# Patient Record
Sex: MALE | Race: WHITE | Employment: FULL TIME | URBAN - METROPOLITAN AREA
[De-identification: names, ages, dates, MRNs, and addresses within clinical notes are randomized per-mention and may not be internally consistent; named-entity substitution may affect disease eponyms.]

---

## 2018-04-05 ENCOUNTER — INITIAL CONSULT (OUTPATIENT)
Dept: SURGERY | Age: 63
End: 2018-04-05
Payer: COMMERCIAL

## 2018-04-05 VITALS
TEMPERATURE: 98.3 F | SYSTOLIC BLOOD PRESSURE: 139 MMHG | OXYGEN SATURATION: 97 % | HEART RATE: 82 BPM | WEIGHT: 214.6 LBS | DIASTOLIC BLOOD PRESSURE: 82 MMHG

## 2018-04-05 DIAGNOSIS — Z12.11 COLON CANCER SCREENING: Primary | ICD-10-CM

## 2018-04-05 PROCEDURE — G8427 DOCREV CUR MEDS BY ELIG CLIN: HCPCS | Performed by: SURGERY

## 2018-04-05 PROCEDURE — 4004F PT TOBACCO SCREEN RCVD TLK: CPT | Performed by: SURGERY

## 2018-04-05 PROCEDURE — 3017F COLORECTAL CA SCREEN DOC REV: CPT | Performed by: SURGERY

## 2018-04-05 PROCEDURE — 99203 OFFICE O/P NEW LOW 30 MIN: CPT | Performed by: SURGERY

## 2018-04-05 PROCEDURE — G8421 BMI NOT CALCULATED: HCPCS | Performed by: SURGERY

## 2018-04-05 RX ORDER — ASPIRIN 81 MG/1
81 TABLET ORAL DAILY
COMMUNITY

## 2018-04-05 RX ORDER — VENLAFAXINE HYDROCHLORIDE 150 MG/1
150 CAPSULE, EXTENDED RELEASE ORAL DAILY
COMMUNITY

## 2018-04-05 RX ORDER — CARVEDILOL 3.12 MG/1
3.12 TABLET ORAL 2 TIMES DAILY WITH MEALS
COMMUNITY

## 2018-04-05 RX ORDER — LOSARTAN POTASSIUM 50 MG/1
50 TABLET ORAL DAILY
COMMUNITY

## 2018-04-05 RX ORDER — ROSUVASTATIN CALCIUM 10 MG/1
10 TABLET, COATED ORAL DAILY
COMMUNITY

## 2018-04-05 ASSESSMENT — ENCOUNTER SYMPTOMS
EYE PAIN: 0
ANAL BLEEDING: 0
EYE DISCHARGE: 0
ABDOMINAL PAIN: 0
BACK PAIN: 0
COLOR CHANGE: 0
VOMITING: 0
TROUBLE SWALLOWING: 0
CHOKING: 0
ABDOMINAL DISTENTION: 0
SHORTNESS OF BREATH: 0
CHEST TIGHTNESS: 0
WHEEZING: 0

## 2018-04-07 RX ORDER — SODIUM, POTASSIUM,MAG SULFATES 17.5-3.13G
1 SOLUTION, RECONSTITUTED, ORAL ORAL ONCE
Qty: 1 BOTTLE | Refills: 0 | Status: SHIPPED | OUTPATIENT
Start: 2018-04-07 | End: 2018-04-07

## 2023-02-08 PROBLEM — Z79.4 TYPE 2 DIABETES MELLITUS WITH HYPERGLYCEMIA, WITH LONG-TERM CURRENT USE OF INSULIN (MULTI): Status: ACTIVE | Noted: 2023-02-08

## 2023-02-08 PROBLEM — N40.1 BPH WITH OBSTRUCTION/LOWER URINARY TRACT SYMPTOMS: Status: ACTIVE | Noted: 2023-02-08

## 2023-02-08 PROBLEM — S76.019A HIP STRAIN: Status: ACTIVE | Noted: 2023-02-08

## 2023-02-08 PROBLEM — F41.8 DEPRESSION WITH ANXIETY: Status: ACTIVE | Noted: 2023-02-08

## 2023-02-08 PROBLEM — E66.09 NON MORBID OBESITY DUE TO EXCESS CALORIES: Status: ACTIVE | Noted: 2023-02-08

## 2023-02-08 PROBLEM — K43.9 ABDOMINAL WALL HERNIA: Status: ACTIVE | Noted: 2023-02-08

## 2023-02-08 PROBLEM — N20.0 NEPHROLITHIASIS: Status: ACTIVE | Noted: 2023-02-08

## 2023-02-08 PROBLEM — R63.5 WEIGHT GAIN: Status: ACTIVE | Noted: 2023-02-08

## 2023-02-08 PROBLEM — F51.01 PRIMARY INSOMNIA: Status: ACTIVE | Noted: 2023-02-08

## 2023-02-08 PROBLEM — R05.9 COUGH: Status: ACTIVE | Noted: 2023-02-08

## 2023-02-08 PROBLEM — F51.02 ADJUSTMENT INSOMNIA: Status: ACTIVE | Noted: 2023-02-08

## 2023-02-08 PROBLEM — N13.8 BPH WITH OBSTRUCTION/LOWER URINARY TRACT SYMPTOMS: Status: ACTIVE | Noted: 2023-02-08

## 2023-02-08 PROBLEM — M75.100 ROTATOR CUFF TEAR: Status: ACTIVE | Noted: 2023-02-08

## 2023-02-08 PROBLEM — M70.62 TROCHANTERIC BURSITIS OF LEFT HIP: Status: ACTIVE | Noted: 2023-02-08

## 2023-02-08 PROBLEM — M79.673 FOOT PAIN: Status: ACTIVE | Noted: 2023-02-08

## 2023-02-08 PROBLEM — S39.012A LUMBAR STRAIN: Status: ACTIVE | Noted: 2023-02-08

## 2023-02-08 PROBLEM — D50.9 ANEMIA, IRON DEFICIENCY: Status: ACTIVE | Noted: 2023-02-08

## 2023-02-08 PROBLEM — I25.10 CORONARY ARTERY DISEASE INVOLVING NATIVE CORONARY ARTERY OF NATIVE HEART WITHOUT ANGINA PECTORIS: Status: ACTIVE | Noted: 2023-02-08

## 2023-02-08 PROBLEM — M25.519 SHOULDER PAIN: Status: ACTIVE | Noted: 2023-02-08

## 2023-02-08 PROBLEM — E55.9 VITAMIN D DEFICIENCY: Status: ACTIVE | Noted: 2023-02-08

## 2023-02-08 PROBLEM — M54.50 LOW BACK PAIN: Status: ACTIVE | Noted: 2023-02-08

## 2023-02-08 PROBLEM — F33.42 RECURRENT MAJOR DEPRESSIVE DISORDER, IN FULL REMISSION (CMS-HCC): Status: ACTIVE | Noted: 2023-02-08

## 2023-02-08 PROBLEM — M54.16 LUMBAR RADICULAR PAIN: Status: ACTIVE | Noted: 2023-02-08

## 2023-02-08 PROBLEM — M72.2 PLANTAR FASCIITIS: Status: ACTIVE | Noted: 2023-02-08

## 2023-02-08 PROBLEM — E11.65 TYPE 2 DIABETES MELLITUS WITH HYPERGLYCEMIA, WITH LONG-TERM CURRENT USE OF INSULIN (MULTI): Status: ACTIVE | Noted: 2023-02-08

## 2023-02-08 PROBLEM — K59.01 SLOW TRANSIT CONSTIPATION: Status: ACTIVE | Noted: 2023-02-08

## 2023-02-08 PROBLEM — R53.81 DEBILITY: Status: ACTIVE | Noted: 2023-02-08

## 2023-02-08 PROBLEM — M25.559 HIP JOINT PAIN: Status: ACTIVE | Noted: 2023-02-08

## 2023-02-08 PROBLEM — M25.50 POLYARTHRALGIA: Status: ACTIVE | Noted: 2023-02-08

## 2023-02-08 PROBLEM — I48.0 PAROXYSMAL ATRIAL FIBRILLATION (MULTI): Status: ACTIVE | Noted: 2023-02-08

## 2023-02-08 PROBLEM — E78.2 MIXED HYPERLIPIDEMIA: Status: ACTIVE | Noted: 2023-02-08

## 2023-02-08 PROBLEM — I10 ESSENTIAL HYPERTENSION: Status: ACTIVE | Noted: 2023-02-08

## 2023-02-08 RX ORDER — METFORMIN HYDROCHLORIDE 850 MG/1
1 TABLET ORAL 3 TIMES DAILY
COMMUNITY
Start: 2017-04-10 | End: 2023-05-15

## 2023-02-08 RX ORDER — VENLAFAXINE HYDROCHLORIDE 150 MG/1
1 CAPSULE, EXTENDED RELEASE ORAL
COMMUNITY
End: 2023-03-23 | Stop reason: SDUPTHER

## 2023-02-08 RX ORDER — ROSUVASTATIN CALCIUM 10 MG/1
1 TABLET, COATED ORAL NIGHTLY
COMMUNITY
Start: 2019-03-20 | End: 2023-06-10

## 2023-02-08 RX ORDER — CELECOXIB 200 MG/1
1 CAPSULE ORAL DAILY PRN
COMMUNITY
Start: 2022-01-21 | End: 2023-07-18 | Stop reason: ALTCHOICE

## 2023-02-08 RX ORDER — TAMSULOSIN HYDROCHLORIDE 0.4 MG/1
CAPSULE ORAL
COMMUNITY
Start: 2018-06-19

## 2023-02-08 RX ORDER — MULTIVIT-MIN/IRON/FOLIC ACID/K 18-600-40
CAPSULE ORAL
COMMUNITY

## 2023-02-08 RX ORDER — VIT C/E/ZN/COPPR/LUTEIN/ZEAXAN 250MG-90MG
25 CAPSULE ORAL EVERY OTHER DAY
COMMUNITY
End: 2024-04-11 | Stop reason: SDUPTHER

## 2023-02-08 RX ORDER — DULAGLUTIDE 1.5 MG/.5ML
0.5 INJECTION, SOLUTION SUBCUTANEOUS
COMMUNITY
Start: 2020-05-22 | End: 2023-03-23 | Stop reason: SDUPTHER

## 2023-02-08 RX ORDER — CARVEDILOL 3.12 MG/1
1 TABLET ORAL 2 TIMES DAILY
COMMUNITY
Start: 2019-02-15 | End: 2023-06-14

## 2023-02-08 RX ORDER — FERROUS GLUCONATE 324(38)MG
1 TABLET ORAL 2 TIMES DAILY
COMMUNITY
Start: 2022-11-16 | End: 2024-04-11 | Stop reason: SDUPTHER

## 2023-02-08 RX ORDER — WARFARIN SODIUM 5 MG/1
TABLET ORAL
COMMUNITY
Start: 2020-07-13 | End: 2023-03-21 | Stop reason: ALTCHOICE

## 2023-02-08 RX ORDER — LOSARTAN POTASSIUM 100 MG/1
1 TABLET ORAL DAILY
COMMUNITY
Start: 2019-04-17 | End: 2023-06-14

## 2023-02-08 RX ORDER — INSULIN DEGLUDEC 100 U/ML
35 INJECTION, SOLUTION SUBCUTANEOUS DAILY
COMMUNITY
Start: 2018-12-10 | End: 2023-06-14

## 2023-02-08 RX ORDER — ASPIRIN 81 MG/1
TABLET ORAL
COMMUNITY

## 2023-02-08 RX ORDER — CYCLOBENZAPRINE HCL 10 MG
1 TABLET ORAL NIGHTLY
COMMUNITY
Start: 2022-01-05 | End: 2023-07-18 | Stop reason: ALTCHOICE

## 2023-02-08 RX ORDER — GABAPENTIN 300 MG/1
1 CAPSULE ORAL 3 TIMES DAILY
COMMUNITY
Start: 2022-02-07

## 2023-03-14 LAB
ALANINE AMINOTRANSFERASE (SGPT) (U/L) IN SER/PLAS: 15 U/L (ref 10–52)
ALBUMIN (G/DL) IN SER/PLAS: 4.1 G/DL (ref 3.4–5)
ALKALINE PHOSPHATASE (U/L) IN SER/PLAS: 69 U/L (ref 33–136)
ANION GAP IN SER/PLAS: 10 MMOL/L (ref 10–20)
ASPARTATE AMINOTRANSFERASE (SGOT) (U/L) IN SER/PLAS: 15 U/L (ref 9–39)
BASOPHILS (10*3/UL) IN BLOOD BY AUTOMATED COUNT: 0.07 X10E9/L (ref 0–0.1)
BASOPHILS/100 LEUKOCYTES IN BLOOD BY AUTOMATED COUNT: 1 % (ref 0–2)
BILIRUBIN TOTAL (MG/DL) IN SER/PLAS: 0.6 MG/DL (ref 0–1.2)
CALCIUM (MG/DL) IN SER/PLAS: 8.9 MG/DL (ref 8.6–10.3)
CARBON DIOXIDE, TOTAL (MMOL/L) IN SER/PLAS: 30 MMOL/L (ref 21–32)
CHLORIDE (MMOL/L) IN SER/PLAS: 103 MMOL/L (ref 98–107)
CREATININE (MG/DL) IN SER/PLAS: 0.84 MG/DL (ref 0.5–1.3)
EOSINOPHILS (10*3/UL) IN BLOOD BY AUTOMATED COUNT: 0.65 X10E9/L (ref 0–0.7)
EOSINOPHILS/100 LEUKOCYTES IN BLOOD BY AUTOMATED COUNT: 9.5 % (ref 0–6)
ERYTHROCYTE DISTRIBUTION WIDTH (RATIO) BY AUTOMATED COUNT: 13.6 % (ref 11.5–14.5)
ERYTHROCYTE MEAN CORPUSCULAR HEMOGLOBIN CONCENTRATION (G/DL) BY AUTOMATED: 31.7 G/DL (ref 32–36)
ERYTHROCYTE MEAN CORPUSCULAR VOLUME (FL) BY AUTOMATED COUNT: 90 FL (ref 80–100)
ERYTHROCYTES (10*6/UL) IN BLOOD BY AUTOMATED COUNT: 4.07 X10E12/L (ref 4.5–5.9)
ESTIMATED AVERAGE GLUCOSE FOR HBA1C: 148 MG/DL
GFR MALE: >90 ML/MIN/1.73M2
GLUCOSE (MG/DL) IN SER/PLAS: 101 MG/DL (ref 74–99)
HEMATOCRIT (%) IN BLOOD BY AUTOMATED COUNT: 36.6 % (ref 41–52)
HEMOGLOBIN (G/DL) IN BLOOD: 11.6 G/DL (ref 13.5–17.5)
HEMOGLOBIN A1C/HEMOGLOBIN TOTAL IN BLOOD: 6.8 %
IMMATURE GRANULOCYTES/100 LEUKOCYTES IN BLOOD BY AUTOMATED COUNT: 0.3 % (ref 0–0.9)
LEUKOCYTES (10*3/UL) IN BLOOD BY AUTOMATED COUNT: 6.9 X10E9/L (ref 4.4–11.3)
LYMPHOCYTES (10*3/UL) IN BLOOD BY AUTOMATED COUNT: 1.86 X10E9/L (ref 1.2–4.8)
LYMPHOCYTES/100 LEUKOCYTES IN BLOOD BY AUTOMATED COUNT: 27.2 % (ref 13–44)
MONOCYTES (10*3/UL) IN BLOOD BY AUTOMATED COUNT: 0.4 X10E9/L (ref 0.1–1)
MONOCYTES/100 LEUKOCYTES IN BLOOD BY AUTOMATED COUNT: 5.8 % (ref 2–10)
NEUTROPHILS (10*3/UL) IN BLOOD BY AUTOMATED COUNT: 3.85 X10E9/L (ref 1.2–7.7)
NEUTROPHILS/100 LEUKOCYTES IN BLOOD BY AUTOMATED COUNT: 56.2 % (ref 40–80)
PLATELETS (10*3/UL) IN BLOOD AUTOMATED COUNT: 317 X10E9/L (ref 150–450)
POTASSIUM (MMOL/L) IN SER/PLAS: 4 MMOL/L (ref 3.5–5.3)
PROTEIN TOTAL: 6.9 G/DL (ref 6.4–8.2)
SODIUM (MMOL/L) IN SER/PLAS: 139 MMOL/L (ref 136–145)
UREA NITROGEN (MG/DL) IN SER/PLAS: 11 MG/DL (ref 6–23)

## 2023-03-21 ENCOUNTER — OFFICE VISIT (OUTPATIENT)
Dept: PRIMARY CARE | Facility: CLINIC | Age: 68
End: 2023-03-21
Payer: MEDICARE

## 2023-03-21 VITALS
DIASTOLIC BLOOD PRESSURE: 75 MMHG | HEART RATE: 79 BPM | WEIGHT: 212 LBS | HEIGHT: 69 IN | SYSTOLIC BLOOD PRESSURE: 118 MMHG | OXYGEN SATURATION: 97 % | BODY MASS INDEX: 31.4 KG/M2 | RESPIRATION RATE: 20 BRPM

## 2023-03-21 DIAGNOSIS — R73.03 PREDIABETES: ICD-10-CM

## 2023-03-21 DIAGNOSIS — I10 ESSENTIAL HYPERTENSION: ICD-10-CM

## 2023-03-21 DIAGNOSIS — E66.09 NON MORBID OBESITY DUE TO EXCESS CALORIES: ICD-10-CM

## 2023-03-21 DIAGNOSIS — N20.0 NEPHROLITHIASIS: ICD-10-CM

## 2023-03-21 DIAGNOSIS — E78.2 MIXED HYPERLIPIDEMIA: ICD-10-CM

## 2023-03-21 DIAGNOSIS — E55.9 VITAMIN D DEFICIENCY: ICD-10-CM

## 2023-03-21 DIAGNOSIS — D50.9 IRON DEFICIENCY ANEMIA, UNSPECIFIED IRON DEFICIENCY ANEMIA TYPE: Primary | ICD-10-CM

## 2023-03-21 PROCEDURE — 3008F BODY MASS INDEX DOCD: CPT | Performed by: INTERNAL MEDICINE

## 2023-03-21 PROCEDURE — 3044F HG A1C LEVEL LT 7.0%: CPT | Performed by: INTERNAL MEDICINE

## 2023-03-21 PROCEDURE — 1160F RVW MEDS BY RX/DR IN RCRD: CPT | Performed by: INTERNAL MEDICINE

## 2023-03-21 PROCEDURE — 3074F SYST BP LT 130 MM HG: CPT | Performed by: INTERNAL MEDICINE

## 2023-03-21 PROCEDURE — 1159F MED LIST DOCD IN RCRD: CPT | Performed by: INTERNAL MEDICINE

## 2023-03-21 PROCEDURE — 3078F DIAST BP <80 MM HG: CPT | Performed by: INTERNAL MEDICINE

## 2023-03-21 PROCEDURE — 99213 OFFICE O/P EST LOW 20 MIN: CPT | Performed by: INTERNAL MEDICINE

## 2023-03-21 PROCEDURE — 1036F TOBACCO NON-USER: CPT | Performed by: INTERNAL MEDICINE

## 2023-03-21 PROCEDURE — 4010F ACE/ARB THERAPY RXD/TAKEN: CPT | Performed by: INTERNAL MEDICINE

## 2023-03-21 RX ORDER — PEN NEEDLE, DIABETIC 32 GX 1/4"
NEEDLE, DISPOSABLE MISCELLANEOUS
COMMUNITY
Start: 2022-11-07 | End: 2023-04-28 | Stop reason: SDUPTHER

## 2023-03-21 RX ORDER — PSYLLIUM SEED
PACKET (EA) ORAL
COMMUNITY
End: 2024-04-11 | Stop reason: WASHOUT

## 2023-03-21 RX ORDER — WARFARIN 7.5 MG/1
TABLET ORAL
COMMUNITY

## 2023-03-21 NOTE — PATIENT INSTRUCTIONS
Thank you very much for coming.  I am very happy to see you.    I am glad that you are managing with your orthopedic doctor and with physical therapy.  Please continue to take care of yourself, and please continue to stay active.    Your latest laboratory examinations are stable, but you still have some persistence of ANEMIA, which is consistent with your history of iron deficiency.  I do understand that you would like to forego further testing.  I will reevaluate this in 4 months, and if persistent, I will recommend that you see a blood specialist or HEMATOLOGIST.  We can also discuss perhaps seeing a belly doctor again to look out for any signs of bleeding in your gut.  We will discuss this further next visit.    Please come back in 4 months.  You will be due for your FASTING laboratory examinations, and you will be due for your Medicare Wellness evaluation at that time.    Until then, please continue to see UROLOGY.  Please continue to drink lots of fluids, and please continue to urinate often.  This will keep your system from holding onto any small kidney stones that you might still have.    Again, thank you very much for coming.  Please continue to take care of yourself and your family, and please continue to pray for our recovery from this pandemic.    Please come back in 4 months.  FASTING laboratory examinations, then see me soon after.  Until then, I do hope that you have a blessed Lent and a happy Easter.            0  Return in 4 months.  40 minutes please.  Repeat fasting laboratory examination, then prepare for Medicare Wellness evaluation.  Coordinate with orthopedics, physical therapy.  Offer hematology once more, reassess for possible repeat GI evaluation.  Coordinate with urology.            0

## 2023-03-21 NOTE — PROGRESS NOTES
"Subjective   Patient ID: Giuseppe Oh is a 67 y.o. male who presents for Follow-up.    HPI   Compliant with medications.  Tolerating regimens.  Tolerating PHYSICAL THERAPY, and continues to follow closely with ORTHOPEDICS.  Gaining function back, left shoulder.  No headache, blurred vision, diplopia.  No dysphagia.  No falls.  Not worried about memory.  Continues to remain motivated to stay healthy.  Not wishing harm to self or others.    Recent history gross hematuria, urolithiasis, left, nephrolithiasis, right.  Being followed closely by UROLOGY.  Currently, has recovered, and has had no persistence of symptoms.  No dysuria, flank, suprapubic pain.  Appetite preserved, with no nausea, vomiting, abdominal distress.  No diarrhea, no constipation.  No apparent blood in stool.  No apparent weight loss.  No skin changes, rashes, pruritus, jaundice.  No easy bruisability.  Noted to have some persistence of anemia, but not any worse.  No gum or nose bleeding.    No cameron substernal chest pain.  No orthopnea, no paroxysmal nocturnal dyspnea.  Continues to stay physically active, and motivated to stay healthy.  Not wishing harm to self or others.  Careful about exposure.  No coughing, no sputum production.  CONSTITUTIONALLY, no fever, no chills.  No night sweats.  No lingering anorexia or nausea.  No apparent lymphadenopathy.  No apparent weight loss.        Review of Systems  Review of systems as in history of present illness, and otherwise, reviewed separately as well, and was unremarkable/negative/noncontributory.        Objective   /75 (BP Location: Left arm, Patient Position: Sitting, BP Cuff Size: Adult)   Pulse 79   Resp 20   Ht 1.753 m (5' 9\")   Wt 96.2 kg (212 lb)   SpO2 97%   BMI 31.31 kg/m²     Physical Exam  In very good spirits.  Not in distress or diaphoresis.  Alert, oriented x3.  Amiable.  Not unkempt.  Obese build.  Receptive.  Cheerful.  Appropriate.  Eager to stay healthy and " independent.  Does not wish harm to self or others.    HEAD pink palpebral conjunctivae, anicteric sclerae.  NECK supple, no apparent jugular venous distention.  CARDIOVASCULAR not in distress or diaphoresis.  No bipedal edema.  LUNGS not in distress or diaphoresis.  Not using accessory muscles.  ABDOMEN soft, nontender.  BACK no costovertebral angle tenderness.  EXTREMITIES no clubbing, no cyanosis.  NEURO no facial asymmetry.  No apparent cranial nerve deficits.  Romberg negative.  Ambulating without need of assistance.  No apparent focal weakness.  No tremors.  PSYCH receptive, appropriate, and eager to maintain and improve quality of life.       Assessment/Plan   Problem List Items Addressed This Visit          Circulatory    Essential hypertension       Genitourinary    Nephrolithiasis       Endocrine/Metabolic    Non morbid obesity due to excess calories    Vitamin D deficiency       Hematologic    Anemia, iron deficiency - Primary       Other    Mixed hyperlipidemia     Other Visit Diagnoses       Prediabetes        BMI 31.0-31.9,adult                 Thank you very much for coming.  I am very happy to see you.    I am glad that you are managing with your orthopedic doctor and with physical therapy.  Please continue to take care of yourself, and please continue to stay active.    Your latest laboratory examinations are stable, but you still have some persistence of ANEMIA, which is consistent with your history of iron deficiency.  I do understand that you would like to forego further testing.  I will reevaluate this in 4 months, and if persistent, I will recommend that you see a blood specialist or HEMATOLOGIST.  We can also discuss perhaps seeing a belly doctor again to look out for any signs of bleeding in your gut.  We will discuss this further next visit.    Please come back in 4 months.  You will be due for your FASTING laboratory examinations, and you will be due for your Medicare Wellness evaluation at  that time.    Until then, please continue to see UROLOGY.  Please continue to drink lots of fluids, and please continue to urinate often.  This will keep your system from holding onto any small kidney stones that you might still have.    Again, thank you very much for coming.  Please continue to take care of yourself and your family, and please continue to pray for our recovery from this pandemic.    Please come back in 4 months.  FASTING laboratory examinations, then see me soon after.  Until then, I do hope that you have a blessed Lent and a happy Easter.            0  Return in 4 months.  40 minutes please.  Repeat fasting laboratory examination, then prepare for Medicare Wellness evaluation.  Coordinate with orthopedics, physical therapy.  Offer hematology once more, reassess for possible repeat GI evaluation.  Coordinate with urology.            0

## 2023-03-23 DIAGNOSIS — F41.8 DEPRESSION WITH ANXIETY: ICD-10-CM

## 2023-03-23 DIAGNOSIS — E11.65 CONTROLLED TYPE 2 DIABETES MELLITUS WITH HYPERGLYCEMIA, WITHOUT LONG-TERM CURRENT USE OF INSULIN (MULTI): Primary | ICD-10-CM

## 2023-03-24 RX ORDER — VENLAFAXINE HYDROCHLORIDE 150 MG/1
150 CAPSULE, EXTENDED RELEASE ORAL
Qty: 90 CAPSULE | Refills: 1 | Status: SHIPPED | OUTPATIENT
Start: 2023-03-24 | End: 2023-06-14

## 2023-03-24 RX ORDER — DULAGLUTIDE 1.5 MG/.5ML
1.5 INJECTION, SOLUTION SUBCUTANEOUS
Qty: 4 EACH | Refills: 3 | Status: SHIPPED | OUTPATIENT
Start: 2023-03-24 | End: 2024-05-10 | Stop reason: SDUPTHER

## 2023-04-28 DIAGNOSIS — E11.65 CONTROLLED TYPE 2 DIABETES MELLITUS WITH HYPERGLYCEMIA, WITH LONG-TERM CURRENT USE OF INSULIN (MULTI): Primary | ICD-10-CM

## 2023-04-28 DIAGNOSIS — Z79.4 CONTROLLED TYPE 2 DIABETES MELLITUS WITH HYPERGLYCEMIA, WITH LONG-TERM CURRENT USE OF INSULIN (MULTI): Primary | ICD-10-CM

## 2023-05-01 RX ORDER — PEN NEEDLE, DIABETIC 32 GX 1/4"
NEEDLE, DISPOSABLE MISCELLANEOUS
Qty: 90 EACH | Refills: 3 | Status: SHIPPED | OUTPATIENT
Start: 2023-05-01 | End: 2024-01-26 | Stop reason: SDUPTHER

## 2023-05-13 DIAGNOSIS — E11.65 TYPE 2 DIABETES MELLITUS WITH HYPERGLYCEMIA, WITH LONG-TERM CURRENT USE OF INSULIN (MULTI): Primary | ICD-10-CM

## 2023-05-13 DIAGNOSIS — Z79.4 TYPE 2 DIABETES MELLITUS WITH HYPERGLYCEMIA, WITH LONG-TERM CURRENT USE OF INSULIN (MULTI): Primary | ICD-10-CM

## 2023-05-15 RX ORDER — METFORMIN HYDROCHLORIDE 850 MG/1
TABLET ORAL
Qty: 270 TABLET | Refills: 0 | Status: SHIPPED | OUTPATIENT
Start: 2023-05-15 | End: 2023-06-14

## 2023-06-08 DIAGNOSIS — E78.2 MIXED HYPERLIPIDEMIA: Primary | ICD-10-CM

## 2023-06-10 RX ORDER — ROSUVASTATIN CALCIUM 10 MG/1
TABLET, COATED ORAL
Qty: 90 TABLET | Refills: 0 | Status: SHIPPED | OUTPATIENT
Start: 2023-06-10 | End: 2023-06-14

## 2023-06-12 DIAGNOSIS — E78.2 MIXED HYPERLIPIDEMIA: ICD-10-CM

## 2023-06-12 DIAGNOSIS — F41.8 DEPRESSION WITH ANXIETY: ICD-10-CM

## 2023-06-12 DIAGNOSIS — I10 ESSENTIAL HYPERTENSION: Primary | ICD-10-CM

## 2023-06-12 DIAGNOSIS — Z79.4 TYPE 2 DIABETES MELLITUS WITH HYPERGLYCEMIA, WITH LONG-TERM CURRENT USE OF INSULIN (MULTI): ICD-10-CM

## 2023-06-12 DIAGNOSIS — E11.65 TYPE 2 DIABETES MELLITUS WITH HYPERGLYCEMIA, WITH LONG-TERM CURRENT USE OF INSULIN (MULTI): ICD-10-CM

## 2023-06-14 RX ORDER — METFORMIN HYDROCHLORIDE 850 MG/1
TABLET ORAL
Qty: 270 TABLET | Refills: 0 | Status: SHIPPED | OUTPATIENT
Start: 2023-06-14 | End: 2023-09-12

## 2023-06-14 RX ORDER — VENLAFAXINE HYDROCHLORIDE 150 MG/1
CAPSULE, EXTENDED RELEASE ORAL
Qty: 90 CAPSULE | Refills: 0 | Status: SHIPPED | OUTPATIENT
Start: 2023-06-14 | End: 2023-09-12

## 2023-06-14 RX ORDER — ROSUVASTATIN CALCIUM 10 MG/1
TABLET, COATED ORAL
Qty: 90 TABLET | Refills: 0 | Status: SHIPPED | OUTPATIENT
Start: 2023-06-14 | End: 2023-12-07 | Stop reason: SDUPTHER

## 2023-06-14 RX ORDER — CARVEDILOL 3.12 MG/1
TABLET ORAL
Qty: 180 TABLET | Refills: 0 | Status: SHIPPED | OUTPATIENT
Start: 2023-06-14 | End: 2023-07-18 | Stop reason: ALTCHOICE

## 2023-06-14 RX ORDER — INSULIN DEGLUDEC 100 U/ML
INJECTION, SOLUTION SUBCUTANEOUS
Qty: 45 ML | Refills: 1 | Status: SHIPPED | OUTPATIENT
Start: 2023-06-14

## 2023-06-14 RX ORDER — LOSARTAN POTASSIUM 100 MG/1
TABLET ORAL
Qty: 90 TABLET | Refills: 0 | Status: SHIPPED | OUTPATIENT
Start: 2023-06-14 | End: 2023-10-26 | Stop reason: SDUPTHER

## 2023-07-11 ENCOUNTER — APPOINTMENT (OUTPATIENT)
Dept: PRIMARY CARE | Facility: CLINIC | Age: 68
End: 2023-07-11
Payer: MEDICARE

## 2023-07-12 ENCOUNTER — LAB (OUTPATIENT)
Dept: LAB | Facility: LAB | Age: 68
End: 2023-07-12
Payer: MEDICARE

## 2023-07-12 DIAGNOSIS — D50.9 IRON DEFICIENCY ANEMIA, UNSPECIFIED IRON DEFICIENCY ANEMIA TYPE: ICD-10-CM

## 2023-07-12 DIAGNOSIS — I10 ESSENTIAL HYPERTENSION: ICD-10-CM

## 2023-07-12 DIAGNOSIS — E55.9 VITAMIN D DEFICIENCY: ICD-10-CM

## 2023-07-12 DIAGNOSIS — E78.2 MIXED HYPERLIPIDEMIA: ICD-10-CM

## 2023-07-12 DIAGNOSIS — R73.03 PREDIABETES: ICD-10-CM

## 2023-07-12 LAB
ALANINE AMINOTRANSFERASE (SGPT) (U/L) IN SER/PLAS: 14 U/L (ref 10–52)
ALBUMIN (G/DL) IN SER/PLAS: 4.2 G/DL (ref 3.4–5)
ALKALINE PHOSPHATASE (U/L) IN SER/PLAS: 85 U/L (ref 33–136)
ANION GAP IN SER/PLAS: 12 MMOL/L (ref 10–20)
APPEARANCE, URINE: CLEAR
ASPARTATE AMINOTRANSFERASE (SGOT) (U/L) IN SER/PLAS: 14 U/L (ref 9–39)
BASOPHILS (10*3/UL) IN BLOOD BY AUTOMATED COUNT: 0.07 X10E9/L (ref 0–0.1)
BASOPHILS/100 LEUKOCYTES IN BLOOD BY AUTOMATED COUNT: 1 % (ref 0–2)
BILIRUBIN TOTAL (MG/DL) IN SER/PLAS: 0.7 MG/DL (ref 0–1.2)
BILIRUBIN, URINE: NEGATIVE
BLOOD, URINE: NEGATIVE
CALCIDIOL (25 OH VITAMIN D3) (NG/ML) IN SER/PLAS: 30 NG/ML
CALCIUM (MG/DL) IN SER/PLAS: 9.2 MG/DL (ref 8.6–10.3)
CARBON DIOXIDE, TOTAL (MMOL/L) IN SER/PLAS: 27 MMOL/L (ref 21–32)
CHLORIDE (MMOL/L) IN SER/PLAS: 103 MMOL/L (ref 98–107)
CHOLESTEROL (MG/DL) IN SER/PLAS: 133 MG/DL (ref 0–199)
CHOLESTEROL IN HDL (MG/DL) IN SER/PLAS: 48.1 MG/DL
CHOLESTEROL/HDL RATIO: 2.8
COBALAMIN (VITAMIN B12) (PG/ML) IN SER/PLAS: 256 PG/ML (ref 211–911)
COLOR, URINE: YELLOW
CREATINE KINASE (U/L) IN SER/PLAS: 60 U/L (ref 0–325)
CREATININE (MG/DL) IN SER/PLAS: 0.8 MG/DL (ref 0.5–1.3)
EOSINOPHILS (10*3/UL) IN BLOOD BY AUTOMATED COUNT: 0.5 X10E9/L (ref 0–0.7)
EOSINOPHILS/100 LEUKOCYTES IN BLOOD BY AUTOMATED COUNT: 7.4 % (ref 0–6)
ERYTHROCYTE DISTRIBUTION WIDTH (RATIO) BY AUTOMATED COUNT: 13.2 % (ref 11.5–14.5)
ERYTHROCYTE MEAN CORPUSCULAR HEMOGLOBIN CONCENTRATION (G/DL) BY AUTOMATED: 32.6 G/DL (ref 32–36)
ERYTHROCYTE MEAN CORPUSCULAR VOLUME (FL) BY AUTOMATED COUNT: 88 FL (ref 80–100)
ERYTHROCYTES (10*6/UL) IN BLOOD BY AUTOMATED COUNT: 4.27 X10E12/L (ref 4.5–5.9)
ESTIMATED AVERAGE GLUCOSE FOR HBA1C: 146 MG/DL
FOLATE (NG/ML) IN SER/PLAS: 21.1 NG/ML
GFR MALE: >90 ML/MIN/1.73M2
GLUCOSE (MG/DL) IN SER/PLAS: 120 MG/DL (ref 74–99)
GLUCOSE, URINE: NEGATIVE MG/DL
HEMATOCRIT (%) IN BLOOD BY AUTOMATED COUNT: 37.7 % (ref 41–52)
HEMOGLOBIN (G/DL) IN BLOOD: 12.3 G/DL (ref 13.5–17.5)
HEMOGLOBIN A1C/HEMOGLOBIN TOTAL IN BLOOD: 6.7 %
IMMATURE GRANULOCYTES/100 LEUKOCYTES IN BLOOD BY AUTOMATED COUNT: 0.6 % (ref 0–0.9)
IRON (UG/DL) IN SER/PLAS: 98 UG/DL (ref 35–150)
IRON BINDING CAPACITY (UG/DL) IN SER/PLAS: 323 UG/DL (ref 240–445)
IRON SATURATION (%) IN SER/PLAS: 30 % (ref 25–45)
KETONES, URINE: NEGATIVE MG/DL
LDL: 66 MG/DL (ref 0–99)
LEUKOCYTE ESTERASE, URINE: NEGATIVE
LEUKOCYTES (10*3/UL) IN BLOOD BY AUTOMATED COUNT: 6.8 X10E9/L (ref 4.4–11.3)
LYMPHOCYTES (10*3/UL) IN BLOOD BY AUTOMATED COUNT: 1.34 X10E9/L (ref 1.2–4.8)
LYMPHOCYTES/100 LEUKOCYTES IN BLOOD BY AUTOMATED COUNT: 19.8 % (ref 13–44)
MAGNESIUM (MG/DL) IN SER/PLAS: 1.83 MG/DL (ref 1.6–2.4)
MONOCYTES (10*3/UL) IN BLOOD BY AUTOMATED COUNT: 0.36 X10E9/L (ref 0.1–1)
MONOCYTES/100 LEUKOCYTES IN BLOOD BY AUTOMATED COUNT: 5.3 % (ref 2–10)
NEUTROPHILS (10*3/UL) IN BLOOD BY AUTOMATED COUNT: 4.46 X10E9/L (ref 1.2–7.7)
NEUTROPHILS/100 LEUKOCYTES IN BLOOD BY AUTOMATED COUNT: 65.9 % (ref 40–80)
NITRITE, URINE: NEGATIVE
PH, URINE: 7 (ref 5–8)
PLATELETS (10*3/UL) IN BLOOD AUTOMATED COUNT: 293 X10E9/L (ref 150–450)
POTASSIUM (MMOL/L) IN SER/PLAS: 4.7 MMOL/L (ref 3.5–5.3)
PROTEIN TOTAL: 6.4 G/DL (ref 6.4–8.2)
PROTEIN, URINE: NEGATIVE MG/DL
SODIUM (MMOL/L) IN SER/PLAS: 137 MMOL/L (ref 136–145)
SPECIFIC GRAVITY, URINE: 1.01 (ref 1–1.03)
THYROTROPIN (MIU/L) IN SER/PLAS BY DETECTION LIMIT <= 0.05 MIU/L: 1.02 MIU/L (ref 0.44–3.98)
TRIGLYCERIDE (MG/DL) IN SER/PLAS: 93 MG/DL (ref 0–149)
UREA NITROGEN (MG/DL) IN SER/PLAS: 13 MG/DL (ref 6–23)
UROBILINOGEN, URINE: <2 MG/DL (ref 0–1.9)
VLDL: 19 MG/DL (ref 0–40)

## 2023-07-12 PROCEDURE — 82607 VITAMIN B-12: CPT

## 2023-07-12 PROCEDURE — 82550 ASSAY OF CK (CPK): CPT

## 2023-07-12 PROCEDURE — 84443 ASSAY THYROID STIM HORMONE: CPT

## 2023-07-12 PROCEDURE — 81003 URINALYSIS AUTO W/O SCOPE: CPT

## 2023-07-12 PROCEDURE — 83540 ASSAY OF IRON: CPT

## 2023-07-12 PROCEDURE — 85025 COMPLETE CBC W/AUTO DIFF WBC: CPT

## 2023-07-12 PROCEDURE — 80053 COMPREHEN METABOLIC PANEL: CPT

## 2023-07-12 PROCEDURE — 82306 VITAMIN D 25 HYDROXY: CPT

## 2023-07-12 PROCEDURE — 82746 ASSAY OF FOLIC ACID SERUM: CPT

## 2023-07-12 PROCEDURE — 36415 COLL VENOUS BLD VENIPUNCTURE: CPT

## 2023-07-12 PROCEDURE — 82728 ASSAY OF FERRITIN: CPT

## 2023-07-12 PROCEDURE — 80061 LIPID PANEL: CPT

## 2023-07-12 PROCEDURE — 83735 ASSAY OF MAGNESIUM: CPT

## 2023-07-12 PROCEDURE — 83036 HEMOGLOBIN GLYCOSYLATED A1C: CPT

## 2023-07-12 PROCEDURE — 83550 IRON BINDING TEST: CPT

## 2023-07-13 LAB — FERRITIN (UG/LL) IN SER/PLAS: 64 UG/L (ref 20–300)

## 2023-07-18 ENCOUNTER — LAB (OUTPATIENT)
Dept: LAB | Facility: LAB | Age: 68
End: 2023-07-18
Payer: MEDICARE

## 2023-07-18 ENCOUNTER — OFFICE VISIT (OUTPATIENT)
Dept: PRIMARY CARE | Facility: CLINIC | Age: 68
End: 2023-07-18
Payer: MEDICARE

## 2023-07-18 VITALS
SYSTOLIC BLOOD PRESSURE: 113 MMHG | WEIGHT: 210 LBS | OXYGEN SATURATION: 96 % | BODY MASS INDEX: 31.1 KG/M2 | DIASTOLIC BLOOD PRESSURE: 70 MMHG | HEIGHT: 69 IN | RESPIRATION RATE: 20 BRPM | HEART RATE: 77 BPM

## 2023-07-18 DIAGNOSIS — E78.2 MIXED HYPERLIPIDEMIA: ICD-10-CM

## 2023-07-18 DIAGNOSIS — Z79.4 TYPE 2 DIABETES MELLITUS WITH HYPERGLYCEMIA, WITH LONG-TERM CURRENT USE OF INSULIN (MULTI): Primary | ICD-10-CM

## 2023-07-18 DIAGNOSIS — B35.1 ONYCHOMYCOSIS: ICD-10-CM

## 2023-07-18 DIAGNOSIS — R35.1 NOCTURIA: ICD-10-CM

## 2023-07-18 DIAGNOSIS — N20.0 NEPHROLITHIASIS: ICD-10-CM

## 2023-07-18 DIAGNOSIS — E55.9 VITAMIN D DEFICIENCY: ICD-10-CM

## 2023-07-18 DIAGNOSIS — Z79.4 TYPE 2 DIABETES MELLITUS WITH HYPERGLYCEMIA, WITH LONG-TERM CURRENT USE OF INSULIN (MULTI): ICD-10-CM

## 2023-07-18 DIAGNOSIS — F33.42 RECURRENT MAJOR DEPRESSIVE DISORDER, IN FULL REMISSION (CMS-HCC): ICD-10-CM

## 2023-07-18 DIAGNOSIS — B35.3 TINEA PEDIS OF LEFT FOOT: ICD-10-CM

## 2023-07-18 DIAGNOSIS — G89.4 CHRONIC PAIN SYNDROME: ICD-10-CM

## 2023-07-18 DIAGNOSIS — E66.09 CLASS 1 OBESITY DUE TO EXCESS CALORIES WITH SERIOUS COMORBIDITY AND BODY MASS INDEX (BMI) OF 31.0 TO 31.9 IN ADULT: ICD-10-CM

## 2023-07-18 DIAGNOSIS — I10 ESSENTIAL HYPERTENSION: ICD-10-CM

## 2023-07-18 DIAGNOSIS — E11.65 TYPE 2 DIABETES MELLITUS WITH HYPERGLYCEMIA, WITH LONG-TERM CURRENT USE OF INSULIN (MULTI): ICD-10-CM

## 2023-07-18 DIAGNOSIS — Z12.5 SCREENING PSA (PROSTATE SPECIFIC ANTIGEN): ICD-10-CM

## 2023-07-18 DIAGNOSIS — Z91.89 FRAMINGHAM CARDIAC RISK >20% IN NEXT 10 YEARS: ICD-10-CM

## 2023-07-18 DIAGNOSIS — D50.9 IRON DEFICIENCY ANEMIA, UNSPECIFIED IRON DEFICIENCY ANEMIA TYPE: ICD-10-CM

## 2023-07-18 DIAGNOSIS — E11.65 TYPE 2 DIABETES MELLITUS WITH HYPERGLYCEMIA, WITH LONG-TERM CURRENT USE OF INSULIN (MULTI): Primary | ICD-10-CM

## 2023-07-18 PROBLEM — E66.811 CLASS 1 OBESITY DUE TO EXCESS CALORIES WITH SERIOUS COMORBIDITY AND BODY MASS INDEX (BMI) OF 31.0 TO 31.9 IN ADULT: Status: ACTIVE | Noted: 2023-02-08

## 2023-07-18 PROCEDURE — 1159F MED LIST DOCD IN RCRD: CPT | Performed by: INTERNAL MEDICINE

## 2023-07-18 PROCEDURE — 82043 UR ALBUMIN QUANTITATIVE: CPT

## 2023-07-18 PROCEDURE — 3078F DIAST BP <80 MM HG: CPT | Performed by: INTERNAL MEDICINE

## 2023-07-18 PROCEDURE — 1160F RVW MEDS BY RX/DR IN RCRD: CPT | Performed by: INTERNAL MEDICINE

## 2023-07-18 PROCEDURE — 82570 ASSAY OF URINE CREATININE: CPT

## 2023-07-18 PROCEDURE — 3074F SYST BP LT 130 MM HG: CPT | Performed by: INTERNAL MEDICINE

## 2023-07-18 PROCEDURE — 4010F ACE/ARB THERAPY RXD/TAKEN: CPT | Performed by: INTERNAL MEDICINE

## 2023-07-18 PROCEDURE — 1036F TOBACCO NON-USER: CPT | Performed by: INTERNAL MEDICINE

## 2023-07-18 PROCEDURE — 3008F BODY MASS INDEX DOCD: CPT | Performed by: INTERNAL MEDICINE

## 2023-07-18 PROCEDURE — G0103 PSA SCREENING: HCPCS

## 2023-07-18 PROCEDURE — 36415 COLL VENOUS BLD VENIPUNCTURE: CPT

## 2023-07-18 PROCEDURE — 99214 OFFICE O/P EST MOD 30 MIN: CPT | Performed by: INTERNAL MEDICINE

## 2023-07-18 PROCEDURE — 3044F HG A1C LEVEL LT 7.0%: CPT | Performed by: INTERNAL MEDICINE

## 2023-07-18 RX ORDER — CICLOPIROX 80 MG/ML
SOLUTION TOPICAL NIGHTLY
Qty: 6.6 ML | Refills: 5 | Status: SHIPPED | OUTPATIENT
Start: 2023-07-18

## 2023-07-18 RX ORDER — METOPROLOL TARTRATE 25 MG/1
25 TABLET, FILM COATED ORAL 2 TIMES DAILY
COMMUNITY

## 2023-07-18 RX ORDER — PRENATAL VIT 91/IRON/FOLIC/DHA 28-975-200
COMBINATION PACKAGE (EA) ORAL 2 TIMES DAILY
Start: 2023-07-18

## 2023-07-18 NOTE — PROGRESS NOTES
"Patient is here for CPE, Has CARDIOLOGIST at [facility]    Subjective   Patient ID: Giuseppe Oh is a 68 y.o. male who presents for Annual Exam.    HPI   The patient is here for his yearly COMPLETE physical examination.  He has no particular complaints.  He is only wondering if he could have his PSA checked.  He has had some episodes of nocturia up to 3 times each night.  Otherwise, no dysuria, flank, suprapubic pain.  No penile discharge, no pruritus.  No malodor.  No change in urine character or habits.  CONSTITUTIONALLY, no fever, no chills.  No night sweats.  No lingering anorexia or nausea.  No apparent lymphadenopathy.  No apparent weight loss.    Compliant with medications, tolerating regimens.  No cameron chest pain.  No orthopnea, no paroxysmal nocturnal dyspnea.  No dizziness, diaphoresis, palpitations.  No loss of consciousness.  No bipedal edema.  No remarkable dyspnea on exertion.  No headache, blurred vision, diplopia.  No dysphagia.  No focal weakness, ataxia, clumsiness.  No falls.  No paresthesia.  No confusion or delirium, with patient not worried about memory.  Not depressive or suicidal, with patient not wishing harm to self or others.      Careful about exposure.  No coughing, no sputum production.  Appetite preserved, with no nausea, vomiting, abdominal distress.  No diarrhea, no constipation.  No apparent blood in stool.  No apparent weight loss.  No skin changes, rashes, pruritus, jaundice.  No easy bruisability.  ENDOCRINE with no polyuria, polydipsia, polyphagia.  No blurred vision.  No skin, hair, nail changes.  No dramatic weight loss or weight gain.          Review of Systems  Review of systems as in history of present illness, and otherwise, reviewed separately as well, and was unremarkable/negative/noncontributory.          Objective   /70 (BP Location: Left arm, Patient Position: Sitting, BP Cuff Size: Adult)   Pulse 77   Resp 20   Ht 1.753 m (5' 9\")   Wt 95.3 " kg (210 lb)   SpO2 96%   BMI 31.01 kg/m²     Physical Exam  In good spirits.  Not in distress or diaphoresis.  Alert.  Oriented x3.  Amiable.  Not unkempt.  Receptive.  Cheerful.  Appropriate.  Eager to stay healthy, independent, and productive.  Does not wish harm to self or others.  Obese build, but completely independent and capable.    HEAD Pink palpebral conjunctivae, anicteric sclerae.  No sinus tenderness.  No tragal tenderness.  Tympanic membranes intact bilaterally.  Mucous membranes moist.  No tonsillopharyngeal congestion.  No thrush.  NECK supple, with no jugular venous distention, no lymph nodes.  No masses.  No bruits.  CARDIOVASCULAR adynamic precordium, with no heaves, thrills, or murmurs.  Regular rate and rhythm.  No bilateral edema.  LUNGS not in distress or diaphoresis.  Not using accessory muscles.  Clear to auscultation bilaterally.  ABDOMEN positive bowel sounds, soft, nontender.  Liver and spleen not palpable.  Traube's space not obliterated.  No masses appreciated.  No herniation appreciated.  GENITALIA with no abnormality, no hernia noted.  Penis with no discharge, no strictures.  Testicles intact.  ANUS no breakdown, no fissuring, no bleeding.  No skin tags, no hemorrhoids.  BACK no costovertebral angle tenderness.  EXTREMITIES no clubbing, no cyanosis.  NEURO no cranial nerve deficits noted.  No facial asymmetry.  Romberg negative.  No tremors.  Babinski negative.  No clonus.  PSYCH receptive, appropriate.  Eager to stay healthy and independent and productive.  Continues to want to maintain and improve quality of life.        LABORATORY examinations reviewed with patient.          Assessment/Plan   Problem List Items Addressed This Visit       Vitamin D deficiency    Type 2 diabetes mellitus with hyperglycemia, with long-term current use of insulin (CMS/[facility]) - Primary    Relevant Orders    Referral to Podiatry    Albumin , Urine Random    Onychomycosis    Relevant Medications     terbinafine (LamISIL AT) 1 % cream    ciclopirox (Penlac) 8 % solution    Other Relevant Orders    Referral to Podiatry    Nephrolithiasis    Mixed hyperlipidemia    [place] cardiac risk >20% in next 10 years    Essential hypertension    Class 1 obesity due to excess calories with serious comorbidity and body mass index (BMI) of 31.0 to 31.9 in adult    Chronic pain syndrome    Anemia, iron deficiency     Other Visit Diagnoses       Tinea pedis of left foot        Relevant Medications    terbinafine (LamISIL AT) 1 % cream    Other Relevant Orders    Referral to Podiatry    Screening PSA (prostate specific antigen)        Relevant Orders    Prostate Specific Antigen    Nocturia        Relevant Orders    Prostate Specific Antigen             Thank you very much for coming.  I am very happy to see you.    Thank you for taking care of yourself.  We did your COMPLETE physical examination for the year, and you seem to be doing very well!  Your heart sounds are nice and crisp and regular.  Your lungs are clear.  Your belly is soft.    Likewise, we reviewed your FASTING laboratory results.  Your hemoglobin A1c is 6.7, good.  You will benefit from further BLOOD examination to check how your kidneys are working.    Please continue seeing your CARDIOLOGIST and EP specialist.  Again, you are doing very well.  Your blood pressure is controlled.  Your risk for heart attack or stroke is controlled with medication.  Watch out for easy bruising.  Watch out for gum or nose bleeding.  Watch out for any blood in your urine or stool.    Please continue taking your IRON supplementation.  Again, you are doing very well.  I am glad that your belly is not bothered by iron.  Your iron stores are better.  Your blood count is stable.  You have a mild anemia, but it is not significant.    You had a COLONOSCOPY within the last 10 years, revealing negative findings.  Again, call me if you have any blood in your stool.  You have been followed  by UROLOGY, and the source of bleeding seems to be from kidney stones.  This seems to be controlled as well.    Please continue taking VITAMIN D supplementation.    Please make an appointment with PODIATRY.  All diabetics should be seen by podiatry at least once a year.  In the meantime, I have ordered a lacquer medication called CICLOPIROX/Penlac.  Pain this onto the great toe and second digit, left foot.  Do this at bedtime every evening.  After 7 days, get some alcohol wipes, and wipe off the excess medication, then paint onto your toenails and surrounding soft tissues again at bedtime every night.    Please come back in 6 weeks.  You will be due for your Medicare Wellness evaluation.  Until then, please continue to take care of yourself and your family, and please continue to pray for our recovery from this pandemic.            0  Return in 6 weeks.  40 minutes please.  Medicare Wellness evaluation.  Reassess mood, energy, function, preventive strategies, consider options regarding weight management in diabetic.  Coordinate with cardiology/EP, reassess overall cardiovascular risk.  Reassess for any GI symptoms, possible need to see GI again, or perhaps hematology if with constitutional symptoms.  Consider reassessment of anemia with blood count.            0

## 2023-07-18 NOTE — PATIENT INSTRUCTIONS
Thank you very much for coming.  I am very happy to see you.    Thank you for taking care of yourself.  We did your COMPLETE physical examination for the year, and you seem to be doing very well!  Your heart sounds are nice and crisp and regular.  Your lungs are clear.  Your belly is soft.    Likewise, we reviewed your FASTING laboratory results.  Your hemoglobin A1c is 6.7, good.  You will benefit from further BLOOD examination to check how your kidneys are working.    Please continue seeing your CARDIOLOGIST and EP specialist.  Again, you are doing very well.  Your blood pressure is controlled.  Your risk for heart attack or stroke is controlled with medication.  Watch out for easy bruising.  Watch out for gum or nose bleeding.  Watch out for any blood in your urine or stool.    Please continue taking your IRON supplementation.  Again, you are doing very well.  I am glad that your belly is not bothered by iron.  Your iron stores are better.  Your blood count is stable.  You have a mild anemia, but it is not significant.    You had a COLONOSCOPY within the last 10 years, revealing negative findings.  Again, call me if you have any blood in your stool.  You have been followed by UROLOGY, and the source of bleeding seems to be from kidney stones.  This seems to be controlled as well.    Please continue taking VITAMIN D supplementation.    Please make an appointment with PODIATRY.  All diabetics should be seen by podiatry at least once a year.  In the meantime, I have ordered a lacquer medication called CICLOPIROX/Penlac.  Pain this onto the great toe and second digit, left foot.  Do this at bedtime every evening.  After 7 days, get some alcohol wipes, and wipe off the excess medication, then paint onto your toenails and surrounding soft tissues again at bedtime every night.    Please come back in 6 weeks.  You will be due for your Medicare Wellness evaluation.  Until then, please continue to take care of yourself and  your family, and please continue to pray for our recovery from this pandemic.            0  Return in 6 weeks.  40 minutes please.  Medicare Wellness evaluation.  Reassess mood, energy, function, preventive strategies, consider options regarding weight management in diabetic.  Coordinate with cardiology/EP, reassess overall cardiovascular risk.  Reassess for any GI symptoms, possible need to see GI again, or perhaps hematology if with constitutional symptoms.  Consider reassessment of anemia with blood count.            0

## 2023-07-19 LAB
ALBUMIN (MG/L) IN URINE: 11.3 MG/L
ALBUMIN/CREATININE (UG/MG) IN URINE: 12 UG/MG CRT (ref 0–30)
CREATININE (MG/DL) IN URINE: 94.4 MG/DL (ref 20–370)
PROSTATE SPECIFIC AG (NG/ML) IN SER/PLAS: 2.32 NG/ML (ref 0–4)

## 2023-07-25 ENCOUNTER — APPOINTMENT (OUTPATIENT)
Dept: PRIMARY CARE | Facility: CLINIC | Age: 68
End: 2023-07-25
Payer: MEDICARE

## 2023-08-31 ENCOUNTER — APPOINTMENT (OUTPATIENT)
Dept: PRIMARY CARE | Facility: CLINIC | Age: 68
End: 2023-08-31
Payer: MEDICARE

## 2023-09-11 DIAGNOSIS — F41.8 DEPRESSION WITH ANXIETY: ICD-10-CM

## 2023-09-11 DIAGNOSIS — E11.65 TYPE 2 DIABETES MELLITUS WITH HYPERGLYCEMIA, WITH LONG-TERM CURRENT USE OF INSULIN (MULTI): ICD-10-CM

## 2023-09-11 DIAGNOSIS — Z79.4 TYPE 2 DIABETES MELLITUS WITH HYPERGLYCEMIA, WITH LONG-TERM CURRENT USE OF INSULIN (MULTI): ICD-10-CM

## 2023-09-12 RX ORDER — METFORMIN HYDROCHLORIDE 850 MG/1
TABLET ORAL
Qty: 270 TABLET | Refills: 0 | Status: SHIPPED | OUTPATIENT
Start: 2023-09-12 | End: 2023-11-10

## 2023-09-12 RX ORDER — VENLAFAXINE HYDROCHLORIDE 150 MG/1
CAPSULE, EXTENDED RELEASE ORAL
Qty: 90 CAPSULE | Refills: 0 | Status: SHIPPED | OUTPATIENT
Start: 2023-09-12 | End: 2023-12-12

## 2023-10-04 ENCOUNTER — OFFICE VISIT (OUTPATIENT)
Dept: PRIMARY CARE | Facility: CLINIC | Age: 68
End: 2023-10-04
Payer: MEDICARE

## 2023-10-04 VITALS
OXYGEN SATURATION: 94 % | SYSTOLIC BLOOD PRESSURE: 136 MMHG | DIASTOLIC BLOOD PRESSURE: 83 MMHG | BODY MASS INDEX: 30.53 KG/M2 | WEIGHT: 206.1 LBS | HEIGHT: 69 IN | RESPIRATION RATE: 20 BRPM | HEART RATE: 73 BPM

## 2023-10-04 DIAGNOSIS — E66.09 CLASS 1 OBESITY DUE TO EXCESS CALORIES WITH SERIOUS COMORBIDITY AND BODY MASS INDEX (BMI) OF 30.0 TO 30.9 IN ADULT: ICD-10-CM

## 2023-10-04 DIAGNOSIS — M72.2 PLANTAR FASCIITIS OF RIGHT FOOT: ICD-10-CM

## 2023-10-04 DIAGNOSIS — N20.0 NEPHROLITHIASIS: ICD-10-CM

## 2023-10-04 DIAGNOSIS — I10 ESSENTIAL HYPERTENSION: ICD-10-CM

## 2023-10-04 DIAGNOSIS — Z91.89 FRAMINGHAM CARDIAC RISK >20% IN NEXT 10 YEARS: ICD-10-CM

## 2023-10-04 DIAGNOSIS — Z00.00 ROUTINE GENERAL MEDICAL EXAMINATION AT HEALTH CARE FACILITY: Primary | ICD-10-CM

## 2023-10-04 DIAGNOSIS — D50.9 IRON DEFICIENCY ANEMIA, UNSPECIFIED IRON DEFICIENCY ANEMIA TYPE: ICD-10-CM

## 2023-10-04 DIAGNOSIS — E11.65 TYPE 2 DIABETES MELLITUS WITH HYPERGLYCEMIA, WITH LONG-TERM CURRENT USE OF INSULIN (MULTI): ICD-10-CM

## 2023-10-04 DIAGNOSIS — Z79.4 TYPE 2 DIABETES MELLITUS WITH HYPERGLYCEMIA, WITH LONG-TERM CURRENT USE OF INSULIN (MULTI): ICD-10-CM

## 2023-10-04 DIAGNOSIS — E55.9 VITAMIN D DEFICIENCY: ICD-10-CM

## 2023-10-04 DIAGNOSIS — E78.2 MIXED HYPERLIPIDEMIA: ICD-10-CM

## 2023-10-04 DIAGNOSIS — Z11.59 ENCOUNTER FOR HEPATITIS C SCREENING TEST FOR LOW RISK PATIENT: ICD-10-CM

## 2023-10-04 PROCEDURE — 3008F BODY MASS INDEX DOCD: CPT | Performed by: INTERNAL MEDICINE

## 2023-10-04 PROCEDURE — 1159F MED LIST DOCD IN RCRD: CPT | Performed by: INTERNAL MEDICINE

## 2023-10-04 PROCEDURE — 3044F HG A1C LEVEL LT 7.0%: CPT | Performed by: INTERNAL MEDICINE

## 2023-10-04 PROCEDURE — 1036F TOBACCO NON-USER: CPT | Performed by: INTERNAL MEDICINE

## 2023-10-04 PROCEDURE — 1160F RVW MEDS BY RX/DR IN RCRD: CPT | Performed by: INTERNAL MEDICINE

## 2023-10-04 PROCEDURE — 3075F SYST BP GE 130 - 139MM HG: CPT | Performed by: INTERNAL MEDICINE

## 2023-10-04 PROCEDURE — 1123F ACP DISCUSS/DSCN MKR DOCD: CPT | Performed by: INTERNAL MEDICINE

## 2023-10-04 PROCEDURE — G0439 PPPS, SUBSEQ VISIT: HCPCS | Performed by: INTERNAL MEDICINE

## 2023-10-04 PROCEDURE — 4010F ACE/ARB THERAPY RXD/TAKEN: CPT | Performed by: INTERNAL MEDICINE

## 2023-10-04 PROCEDURE — 1170F FXNL STATUS ASSESSED: CPT | Performed by: INTERNAL MEDICINE

## 2023-10-04 PROCEDURE — 3079F DIAST BP 80-89 MM HG: CPT | Performed by: INTERNAL MEDICINE

## 2023-10-04 PROCEDURE — 99213 OFFICE O/P EST LOW 20 MIN: CPT | Performed by: INTERNAL MEDICINE

## 2023-10-04 ASSESSMENT — ACTIVITIES OF DAILY LIVING (ADL)
GROCERY_SHOPPING: INDEPENDENT
BATHING: INDEPENDENT
DOING_HOUSEWORK: INDEPENDENT
TAKING_MEDICATION: INDEPENDENT
DRESSING: INDEPENDENT
MANAGING_FINANCES: INDEPENDENT

## 2023-10-04 ASSESSMENT — ENCOUNTER SYMPTOMS
OCCASIONAL FEELINGS OF UNSTEADINESS: 0
LOSS OF SENSATION IN FEET: 0
DEPRESSION: 0

## 2023-10-04 ASSESSMENT — PATIENT HEALTH QUESTIONNAIRE - PHQ9
1. LITTLE INTEREST OR PLEASURE IN DOING THINGS: NOT AT ALL
2. FEELING DOWN, DEPRESSED OR HOPELESS: NOT AT ALL
SUM OF ALL RESPONSES TO PHQ9 QUESTIONS 1 AND 2: 0

## 2023-10-04 NOTE — PROGRESS NOTES
Subjective   Reason for Visit: Giuseppe Oh is an 68 y.o. male here for a Medicare Wellness visit.     Past Medical, Surgical, and Family History reviewed and updated in chart.    Reviewed all medications by prescribing practitioner or clinical pharmacist (such as prescriptions, OTCs, herbal therapies and supplements) and documented in the medical record.    HPI  The patient is here for his Medicare Wellness visit for the year, and to allow me to review preventive strategies, cardiovascular risk.    Compliant with medications, tolerating regimens.  Blood sugars have seemingly been controlled.  Patient remaining physically active.  Eating sensibly.  ENDOCRINE with no polyuria, polydipsia, polyphagia.  No blurred vision.  No skin, hair, nail changes.  No dramatic weight loss or weight gain.      Likewise, following closely with cardiology/EP.  Again, physically active and compliant with medications, tolerating regimens.  No cameron chest pain.  No orthopnea, no paroxysmal nocturnal dyspnea.  No dizziness, diaphoresis, palpitations.  No loss of consciousness.  No bipedal edema.  No remarkable dyspnea on exertion.  Likewise, careful about exposure.  No coughing, no sputum production.  Has received his INFLUENZA vaccination, as well as his COVID BOOSTER.  CONSTITUTIONALLY, no fever, no chills.  No night sweats.  No lingering anorexia or nausea.  No apparent lymphadenopathy.  No apparent weight loss.    No gum or nose bleeding.  No easy bruisability.  No apparent blood in urine or stool.  Likewise, no palpitations, dizziness, diaphoresis, or any other symptoms that might be referable to HYPOVOLEMIA.  No skin changes, rashes, pruritus, jaundice.    Remains in very good spirits, and continues to want to stay healthy, independent, and productive, and does not wish harm to self or others.  Working well with his wife.  Staying busy at home.  No headache, blurred vision, diplopia.  No dysphagia.  Does not wish harm to  "self or others.    Following closely with UROLOGY.  No change in urine character or habits.    In the meantime, coping well with history of right plantar fasciitis, and continues to follow with PODIATRY, and continues to do stretching exercises.    CODE STATUS, LIVING WILL wishes reviewed, below.    Patient Care Team:  Melvin Page MD as PCP - General  Melvin Page MD as PCP - [facility] Attributed Provider         Review of Systems  Review of systems as in history of present illness, and otherwise, reviewed separately as well, and was unremarkable/negative/noncontributory.          Objective   Vitals:  /83 (BP Location: Right arm, Patient Position: Sitting, BP Cuff Size: Adult)   Pulse 73   Resp 20   Ht 1.753 m (5' 9\")   Wt 93.5 kg (206 lb 1.6 oz)   SpO2 94%   BMI 30.44 kg/m²       Physical Exam  In very good spirits.  Not in distress or diaphoresis.  Alert, oriented x3.  Amiable.  Not unkempt.  Receptive.  Cheerful.  Appropriate.  Moderately obese build, but completely independent and capable to care for instrumental activities.  Continues to want to improve quality of life, and does not wish harm to self or others.    HEAD pink palpebral conjunctivae, anicteric sclerae.  NECK supple, no apparent jugular venous distention.  CARDIOVASCULAR not in distress or diaphoresis.  No bipedal edema.  LUNGS not in distress or diaphoresis.  Not using accessory muscles.  ABDOMEN soft, nontender.  BACK no costovertebral angle tenderness.  EXTREMITIES no clubbing, no cyanosis.  NEURO no facial asymmetry.  No apparent cranial nerve deficits.  Romberg negative.  Ambulating without need of assistance.  No apparent focal weakness.  No tremors.  PSYCH receptive, appropriate, and eager to maintain and improve quality of life.      LABORATORY results reviewed with patient.        Assessment/Plan   Problem List Items Addressed This Visit       Anemia, iron deficiency    Relevant Orders    CBC and Auto " Differential    Ferritin    Class 1 obesity due to excess calories with serious comorbidity and body mass index (BMI) of 30.0 to 30.9 in adult    Relevant Orders    TSH with reflex to Free T4 if abnormal    Essential hypertension    Relevant Orders    CBC and Auto Differential    Comprehensive Metabolic Panel    Magnesium    TSH with reflex to Free T4 if abnormal    [place] cardiac risk >20% in next 10 years    Overview     20.2% 07/23         Mixed hyperlipidemia    Relevant Orders    Comprehensive Metabolic Panel    Lipid Panel    Nephrolithiasis    Overview     Stent placement Dr. Santiago [facility]         Relevant Orders    CBC and Auto Differential    Comprehensive Metabolic Panel    Plantar fasciitis of right foot    Type 2 diabetes mellitus with hyperglycemia, with long-term current use of insulin (CMS/HCC)    Relevant Orders    CBC and Auto Differential    Comprehensive Metabolic Panel    Hemoglobin A1C    Vitamin D deficiency     Other Visit Diagnoses       Routine general medical examination at health care facility    -  Primary    Encounter for hepatitis C screening test for low risk patient        Relevant Orders    Hepatitis C antibody               Thank you very much for coming.  I am very happy to see you, especially since you have been taking your medications properly, and you have been taking care of yourself!  Remember, your job is to make me look good!    Seriously, you have a very good jeans stock, and the such, you will live a long life, and taking care of yourself now will mean that you will have a healthier life in the future as well.  Again, thank you for taking care of yourself.    You will benefit from VACCINATIONS:  PNEUMONIA vaccination, PREVNAR 20 soon.  You can get this from your favorite pharmacy.  After 2 weeks, go ahead and schedule yourself for the new SHINGLES vaccination, SHINGRIX, which comes into injections at least 1 month apart.    If possible, keep your vaccinations at least 2  weeks apart, so that your body can have the most robust response while keeping possible side effects to a minimum, including fatigue and achiness.  Drink lots of fluids throughout the day.  Get lots of rest and sleep.    Again, please continue taking care of yourself.  Make sure you are getting enough rest and sleep.  Please continue drinking lots of fluids throughout the day and avoid excessive salt.  Good for blood pressure control as well as kidney function.  Please continue to eat sensibly.  Check your blood sugar from time to time, and call me if you have fasting values that are 250 or higher.  Please continue staying physically active, especially during the wintertime.  Sometimes, your insurance will even pay for gym memberships like Silver [employer]neakers!    Please continue following with your cardiologist/EP, as well as your nephrologist.    Please continue doing your physical therapy, and please continue to see your podiatrist as needed.    We did your Medicare Wellness visit today, and you are doing very very well.  Please continue to take care of yourself, vaccinations soon.  FASTING laboratory examinations a few days before your next appointment in 2 months.    We reviewed your LIVING WILL wishes and your CODE STATUS.  We will keep your WIFE, Yury, as your DURABLE POWER OF  for healthcare matters.  This means that if you are unable to make decisions for yourself, she will be the next person that we will talk to to make healthcare decisions for you.    It is appropriate that with your ongoing health, your CODE STATUS is FULL CODE, meaning that if something happens, we can do CPR to revive you, and then we can make some investigations to see what your underlying problem might be.  Of course, if you are quality of life is compromised, we will discuss this with you and your wife, and this may impact your decisions.  For now, extraordinary means of treatment will be made to save your life and to preserve  it.    Again, thank you very much for coming.  Please do not hesitate to call with questions or concerns.  Please continue to take care of yourself and your family, and please continue to pray for our recovery from this pandemic.    Please come back in 2 months.  Do some FASTING laboratory examinations via [facility] facility, then see me soon after.  I will prepare you for winter at that point.  Call sooner please as needed.  Again, thank you for taking care of yourself.            0  Return in 2 months.  20 minutes please.  FASTING laboratory examination via [facility], then see me for reevaluation of hemodynamics, cardiovascular risk, diabetes control, preventive strategies.  Coordinate with cardiology, EP, podiatry, urology, consider GI, hematology if appropriate.  Prepare for winter.              0

## 2023-10-04 NOTE — PATIENT INSTRUCTIONS
Thank you very much for coming.  I am very happy to see you, especially since you have been taking your medications properly, and you have been taking care of yourself!  Remember, your job is to make me look good!    Seriously, you have a very good jeans stock, and the such, you will live a long life, and taking care of yourself now will mean that you will have a healthier life in the future as well.  Again, thank you for taking care of yourself.    You will benefit from VACCINATIONS:  PNEUMONIA vaccination, PREVNAR 20 soon.  You can get this from your favorite pharmacy.  After 2 weeks, go ahead and schedule yourself for the new SHINGLES vaccination, SHINGRIX, which comes into injections at least 1 month apart.    If possible, keep your vaccinations at least 2 weeks apart, so that your body can have the most robust response while keeping possible side effects to a minimum, including fatigue and achiness.  Drink lots of fluids throughout the day.  Get lots of rest and sleep.    Again, please continue taking care of yourself.  Make sure you are getting enough rest and sleep.  Please continue drinking lots of fluids throughout the day and avoid excessive salt.  Good for blood pressure control as well as kidney function.  Please continue to eat sensibly.  Check your blood sugar from time to time, and call me if you have fasting values that are 250 or higher.  Please continue staying physically active, especially during the wintertime.  Sometimes, your insurance will even pay for gym memberships like Silver [employer]!    Please continue following with your cardiologist/EP, as well as your nephrologist.    Please continue doing your physical therapy, and please continue to see your podiatrist as needed.    We did your Medicare Wellness visit today, and you are doing very very well.  Please continue to take care of yourself, vaccinations soon.  FASTING laboratory examinations a few days before your next appointment in 2  months.    We reviewed your LIVING WILL wishes and your CODE STATUS.  We will keep your WIFE, Yury, as your DURABLE POWER OF  for healthcare matters.  This means that if you are unable to make decisions for yourself, she will be the next person that we will talk to to make healthcare decisions for you.    It is appropriate that with your ongoing health, your CODE STATUS is FULL CODE, meaning that if something happens, we can do CPR to revive you, and then we can make some investigations to see what your underlying problem might be.  Of course, if you are quality of life is compromised, we will discuss this with you and your wife, and this may impact your decisions.  For now, extraordinary means of treatment will be made to save your life and to preserve it.    Again, thank you very much for coming.  Please do not hesitate to call with questions or concerns.  Please continue to take care of yourself and your family, and please continue to pray for our recovery from this pandemic.    Please come back in 2 months.  Do some FASTING laboratory examinations via [facility] facility, then see me soon after.  I will prepare you for winter at that point.  Call sooner please as needed.  Again, thank you for taking care of yourself.            0  Return in 2 months.  20 minutes please.  FASTING laboratory examination via [facility], then see me for reevaluation of hemodynamics, cardiovascular risk, diabetes control, preventive strategies.  Coordinate with cardiology, EP, podiatry, urology, consider GI, hematology if appropriate.  Prepare for winter.              0

## 2023-10-26 DIAGNOSIS — I10 ESSENTIAL HYPERTENSION: ICD-10-CM

## 2023-10-26 RX ORDER — LOSARTAN POTASSIUM 100 MG/1
100 TABLET ORAL
Qty: 90 TABLET | Refills: 0 | Status: SHIPPED | OUTPATIENT
Start: 2023-10-26 | End: 2023-11-10

## 2023-10-27 DIAGNOSIS — I10 ESSENTIAL HYPERTENSION: ICD-10-CM

## 2023-11-09 DIAGNOSIS — I10 ESSENTIAL HYPERTENSION: ICD-10-CM

## 2023-11-09 DIAGNOSIS — E11.65 TYPE 2 DIABETES MELLITUS WITH HYPERGLYCEMIA, WITH LONG-TERM CURRENT USE OF INSULIN (MULTI): ICD-10-CM

## 2023-11-09 DIAGNOSIS — Z79.4 TYPE 2 DIABETES MELLITUS WITH HYPERGLYCEMIA, WITH LONG-TERM CURRENT USE OF INSULIN (MULTI): ICD-10-CM

## 2023-11-10 RX ORDER — LOSARTAN POTASSIUM 100 MG/1
TABLET ORAL
Qty: 90 TABLET | Refills: 0 | Status: SHIPPED | OUTPATIENT
Start: 2023-11-10 | End: 2024-01-12 | Stop reason: WASHOUT

## 2023-11-10 RX ORDER — METFORMIN HYDROCHLORIDE 850 MG/1
TABLET ORAL
Qty: 270 TABLET | Refills: 0 | Status: SHIPPED | OUTPATIENT
Start: 2023-11-10 | End: 2024-04-03

## 2023-11-29 RX ORDER — LOSARTAN POTASSIUM 100 MG/1
100 TABLET ORAL
Qty: 90 TABLET | Refills: 0 | Status: SHIPPED | OUTPATIENT
Start: 2023-11-29 | End: 2024-01-11 | Stop reason: SDUPTHER

## 2023-12-01 ENCOUNTER — LAB (OUTPATIENT)
Dept: LAB | Facility: LAB | Age: 68
End: 2023-12-01
Payer: MEDICARE

## 2023-12-01 DIAGNOSIS — Z11.59 ENCOUNTER FOR HEPATITIS C SCREENING TEST FOR LOW RISK PATIENT: ICD-10-CM

## 2023-12-01 DIAGNOSIS — I10 ESSENTIAL HYPERTENSION: ICD-10-CM

## 2023-12-01 DIAGNOSIS — Z79.4 TYPE 2 DIABETES MELLITUS WITH HYPERGLYCEMIA, WITH LONG-TERM CURRENT USE OF INSULIN (MULTI): ICD-10-CM

## 2023-12-01 DIAGNOSIS — D50.9 IRON DEFICIENCY ANEMIA, UNSPECIFIED IRON DEFICIENCY ANEMIA TYPE: ICD-10-CM

## 2023-12-01 DIAGNOSIS — E11.65 TYPE 2 DIABETES MELLITUS WITH HYPERGLYCEMIA, WITH LONG-TERM CURRENT USE OF INSULIN (MULTI): ICD-10-CM

## 2023-12-01 DIAGNOSIS — N20.0 NEPHROLITHIASIS: ICD-10-CM

## 2023-12-01 DIAGNOSIS — E78.2 MIXED HYPERLIPIDEMIA: ICD-10-CM

## 2023-12-01 DIAGNOSIS — E66.09 CLASS 1 OBESITY DUE TO EXCESS CALORIES WITH SERIOUS COMORBIDITY AND BODY MASS INDEX (BMI) OF 30.0 TO 30.9 IN ADULT: ICD-10-CM

## 2023-12-01 LAB
ALBUMIN SERPL BCP-MCNC: 4.1 G/DL (ref 3.4–5)
ALP SERPL-CCNC: 65 U/L (ref 33–136)
ALT SERPL W P-5'-P-CCNC: 15 U/L (ref 10–52)
ANION GAP SERPL CALC-SCNC: 10 MMOL/L (ref 10–20)
AST SERPL W P-5'-P-CCNC: 14 U/L (ref 9–39)
BASOPHILS # BLD AUTO: 0.1 X10*3/UL (ref 0–0.1)
BASOPHILS NFR BLD AUTO: 1.2 %
BILIRUB SERPL-MCNC: 0.6 MG/DL (ref 0–1.2)
BUN SERPL-MCNC: 14 MG/DL (ref 6–23)
CALCIUM SERPL-MCNC: 9 MG/DL (ref 8.6–10.3)
CHLORIDE SERPL-SCNC: 104 MMOL/L (ref 98–107)
CHOLEST SERPL-MCNC: 125 MG/DL (ref 0–199)
CHOLESTEROL/HDL RATIO: 2.4
CO2 SERPL-SCNC: 30 MMOL/L (ref 21–32)
CREAT SERPL-MCNC: 0.83 MG/DL (ref 0.5–1.3)
EOSINOPHIL # BLD AUTO: 0.73 X10*3/UL (ref 0–0.7)
EOSINOPHIL NFR BLD AUTO: 9.1 %
ERYTHROCYTE [DISTWIDTH] IN BLOOD BY AUTOMATED COUNT: 12.3 % (ref 11.5–14.5)
EST. AVERAGE GLUCOSE BLD GHB EST-MCNC: 148 MG/DL
FERRITIN SERPL-MCNC: 80 NG/ML (ref 20–300)
GFR SERPL CREATININE-BSD FRML MDRD: >90 ML/MIN/1.73M*2
GLUCOSE SERPL-MCNC: 82 MG/DL (ref 74–99)
HBA1C MFR BLD: 6.8 %
HCT VFR BLD AUTO: 38.3 % (ref 41–52)
HCV AB SER QL: NONREACTIVE
HDLC SERPL-MCNC: 52.5 MG/DL
HGB BLD-MCNC: 12.6 G/DL (ref 13.5–17.5)
IMM GRANULOCYTES # BLD AUTO: 0.03 X10*3/UL (ref 0–0.7)
IMM GRANULOCYTES NFR BLD AUTO: 0.4 % (ref 0–0.9)
LDLC SERPL CALC-MCNC: 58 MG/DL
LYMPHOCYTES # BLD AUTO: 1.79 X10*3/UL (ref 1.2–4.8)
LYMPHOCYTES NFR BLD AUTO: 22.3 %
MAGNESIUM SERPL-MCNC: 1.74 MG/DL (ref 1.6–2.4)
MCH RBC QN AUTO: 29 PG (ref 26–34)
MCHC RBC AUTO-ENTMCNC: 32.9 G/DL (ref 32–36)
MCV RBC AUTO: 88 FL (ref 80–100)
MONOCYTES # BLD AUTO: 0.36 X10*3/UL (ref 0.1–1)
MONOCYTES NFR BLD AUTO: 4.5 %
NEUTROPHILS # BLD AUTO: 5.01 X10*3/UL (ref 1.2–7.7)
NEUTROPHILS NFR BLD AUTO: 62.5 %
NON HDL CHOLESTEROL: 73 MG/DL (ref 0–149)
NRBC BLD-RTO: 0 /100 WBCS (ref 0–0)
PLATELET # BLD AUTO: 303 X10*3/UL (ref 150–450)
POTASSIUM SERPL-SCNC: 4.8 MMOL/L (ref 3.5–5.3)
PROT SERPL-MCNC: 6.5 G/DL (ref 6.4–8.2)
RBC # BLD AUTO: 4.34 X10*6/UL (ref 4.5–5.9)
SODIUM SERPL-SCNC: 139 MMOL/L (ref 136–145)
TRIGL SERPL-MCNC: 75 MG/DL (ref 0–149)
TSH SERPL-ACNC: 1.7 MIU/L (ref 0.44–3.98)
VLDL: 15 MG/DL (ref 0–40)
WBC # BLD AUTO: 8 X10*3/UL (ref 4.4–11.3)

## 2023-12-01 PROCEDURE — 84443 ASSAY THYROID STIM HORMONE: CPT

## 2023-12-01 PROCEDURE — 83735 ASSAY OF MAGNESIUM: CPT

## 2023-12-01 PROCEDURE — 82728 ASSAY OF FERRITIN: CPT

## 2023-12-01 PROCEDURE — 80053 COMPREHEN METABOLIC PANEL: CPT

## 2023-12-01 PROCEDURE — 86803 HEPATITIS C AB TEST: CPT

## 2023-12-01 PROCEDURE — 85025 COMPLETE CBC W/AUTO DIFF WBC: CPT

## 2023-12-01 PROCEDURE — 36415 COLL VENOUS BLD VENIPUNCTURE: CPT

## 2023-12-01 PROCEDURE — 80061 LIPID PANEL: CPT

## 2023-12-01 PROCEDURE — 83036 HEMOGLOBIN GLYCOSYLATED A1C: CPT

## 2023-12-06 ENCOUNTER — APPOINTMENT (OUTPATIENT)
Dept: PRIMARY CARE | Facility: CLINIC | Age: 68
End: 2023-12-06
Payer: MEDICARE

## 2023-12-07 ENCOUNTER — OFFICE VISIT (OUTPATIENT)
Dept: PRIMARY CARE | Facility: CLINIC | Age: 68
End: 2023-12-07
Payer: MEDICARE

## 2023-12-07 VITALS
WEIGHT: 208 LBS | HEART RATE: 78 BPM | HEIGHT: 69 IN | BODY MASS INDEX: 30.81 KG/M2 | RESPIRATION RATE: 20 BRPM | OXYGEN SATURATION: 98 % | SYSTOLIC BLOOD PRESSURE: 122 MMHG | DIASTOLIC BLOOD PRESSURE: 80 MMHG

## 2023-12-07 DIAGNOSIS — E11.65 TYPE 2 DIABETES MELLITUS WITH HYPERGLYCEMIA, WITH LONG-TERM CURRENT USE OF INSULIN (MULTI): ICD-10-CM

## 2023-12-07 DIAGNOSIS — D50.9 IRON DEFICIENCY ANEMIA, UNSPECIFIED IRON DEFICIENCY ANEMIA TYPE: ICD-10-CM

## 2023-12-07 DIAGNOSIS — E55.9 VITAMIN D DEFICIENCY: ICD-10-CM

## 2023-12-07 DIAGNOSIS — E66.09 CLASS 1 OBESITY DUE TO EXCESS CALORIES WITH SERIOUS COMORBIDITY AND BODY MASS INDEX (BMI) OF 30.0 TO 30.9 IN ADULT: ICD-10-CM

## 2023-12-07 DIAGNOSIS — I10 ESSENTIAL HYPERTENSION: ICD-10-CM

## 2023-12-07 DIAGNOSIS — E78.2 MIXED HYPERLIPIDEMIA: Primary | ICD-10-CM

## 2023-12-07 DIAGNOSIS — Z79.4 TYPE 2 DIABETES MELLITUS WITH HYPERGLYCEMIA, WITH LONG-TERM CURRENT USE OF INSULIN (MULTI): ICD-10-CM

## 2023-12-07 PROCEDURE — 4010F ACE/ARB THERAPY RXD/TAKEN: CPT | Performed by: INTERNAL MEDICINE

## 2023-12-07 PROCEDURE — 1160F RVW MEDS BY RX/DR IN RCRD: CPT | Performed by: INTERNAL MEDICINE

## 2023-12-07 PROCEDURE — 99213 OFFICE O/P EST LOW 20 MIN: CPT | Performed by: INTERNAL MEDICINE

## 2023-12-07 PROCEDURE — 3008F BODY MASS INDEX DOCD: CPT | Performed by: INTERNAL MEDICINE

## 2023-12-07 PROCEDURE — 3074F SYST BP LT 130 MM HG: CPT | Performed by: INTERNAL MEDICINE

## 2023-12-07 PROCEDURE — 1159F MED LIST DOCD IN RCRD: CPT | Performed by: INTERNAL MEDICINE

## 2023-12-07 PROCEDURE — 3044F HG A1C LEVEL LT 7.0%: CPT | Performed by: INTERNAL MEDICINE

## 2023-12-07 PROCEDURE — 1036F TOBACCO NON-USER: CPT | Performed by: INTERNAL MEDICINE

## 2023-12-07 PROCEDURE — 3079F DIAST BP 80-89 MM HG: CPT | Performed by: INTERNAL MEDICINE

## 2023-12-07 PROCEDURE — 3048F LDL-C <100 MG/DL: CPT | Performed by: INTERNAL MEDICINE

## 2023-12-07 RX ORDER — ROSUVASTATIN CALCIUM 10 MG/1
TABLET, COATED ORAL
Qty: 90 TABLET | Refills: 1 | Status: SHIPPED | OUTPATIENT
Start: 2023-12-07 | End: 2024-04-03

## 2023-12-07 NOTE — PROGRESS NOTES
"Subjective   Patient ID: Giuseppe Oh is a 68 y.o. male who presents for Follow-up.    HPI   The patient is here for reevaluation of hemodynamics, cardiovascular risk, and to allow me to coordinate with cardiology, EP.  Compliant with medications, tolerating regimens.  No gum or nose bleeding.  No easy bruisability.  No dyspeptic symptoms.  No apparent blood in urine or stool.  Likewise, no dizziness, diaphoresis, palpitations, no lightheadedness, or any other symptoms that might be referable to hypovolemia.  No cameron substernal chest pain.  No orthopnea, no paroxysmal nocturnal dyspnea.    Again, compliant with medications, tolerating regimens.  ENDOCRINE with no polyuria, polydipsia, polyphagia.  No blurred vision.  No skin, hair, nail changes.  No dramatic weight loss or weight gain.  No abdominal distress.  No dysuria, flank, suprapubic pain.  No skin changes, no jaundice.  Likewise, no coughing, no sputum production.  CONSTITUTIONALLY, no fever, no chills.  No night sweats.  No lingering anorexia or nausea.  No apparent lymphadenopathy.  No apparent weight loss.    The patient has been in very good spirits, and continues to want to stay healthy, independent, and productive, and does not wish harm to self or others.  Considering changing VENLAFAXINE, but would rather not.  This is because of drug coverage.  Otherwise, again, does not wish harm to self or others.  Continues to want to maintain and improve quality of life.  No headache, blurred vision, diplopia.  No dysphagia.        Review of Systems  Review of systems as in history of present illness, and otherwise, reviewed separately as well, and was unremarkable/negative/noncontributory.        Objective   /80 (BP Location: Right arm, Patient Position: Sitting, BP Cuff Size: Adult)   Pulse 78   Resp 20   Ht 1.753 m (5' 9\")   Wt 94.3 kg (208 lb)   SpO2 98%   BMI 30.72 kg/m²     Physical Exam  In good spirits.  Not in distress or " diaphoresis.  Alert, oriented x 3.  Amiable.  Not unkempt.  Receptive, cheerful, appropriate.  Eager to stay healthy, independent, and productive, and does not wish harm to self or others.  Moderately obese build.    HEAD pink palpebral conjunctivae, anicteric sclerae.  NECK supple, no apparent jugular venous distention.  CARDIOVASCULAR not in distress or diaphoresis.  No bipedal edema.  LUNGS not in distress or diaphoresis.  Not using accessory muscles.  ABDOMEN soft, nontender.  BACK no costovertebral angle tenderness.  EXTREMITIES no clubbing, no cyanosis.  NEURO no facial asymmetry.  No apparent cranial nerve deficits.  Romberg negative.  Ambulating without need of assistance.  No apparent focal weakness.  No tremors.  PSYCH receptive, appropriate, and eager to maintain and improve quality of life.        LABORATORY results reviewed with patient.      Assessment/Plan   Diagnoses and all orders for this visit:  Mixed hyperlipidemia  -     rosuvastatin (Crestor) 10 mg tablet; Please take 1 tablet by mouth at bedtime.  Thank you.  -     Follow Up In Primary Care - Established; Future  -     Comprehensive Metabolic Panel; Future  -     Lipid Panel; Future  Type 2 diabetes mellitus with hyperglycemia, with long-term current use of insulin (CMS/[facility])  -     Follow Up In Primary Care - Established; Future  -     Comprehensive Metabolic Panel; Future  -     Hemoglobin A1C; Future  -     Albumin , Urine Random; Future  Essential hypertension  -     Follow Up In Primary Care - Established; Future  -     Comprehensive Metabolic Panel; Future  -     Urinalysis with Reflex Microscopic and Culture; Future  -     CBC and Auto Differential; Future  -     Magnesium; Future  -     TSH with reflex to Free T4 if abnormal; Future  Class 1 obesity due to excess calories with serious comorbidity and body mass index (BMI) of 30.0 to 30.9 in adult  -     Follow Up In Primary Care - Established; Future  -     Comprehensive Metabolic  Panel; Future  -     TSH with reflex to Free T4 if abnormal; Future  Iron deficiency anemia, unspecified iron deficiency anemia type  -     Follow Up In Primary Care - Established; Future  -     Urinalysis with Reflex Microscopic and Culture; Future  -     CBC and Auto Differential; Future  -     Vitamin B12; Future  -     Folate; Future  -     Ferritin; Future  -     Iron and TIBC; Future  Vitamin D deficiency  -     Follow Up In Primary Care - Established; Future  -     Comprehensive Metabolic Panel; Future  -     Vitamin D 25-Hydroxy,Total (for eval of Vitamin D levels); Future  Other orders  -     Follow Up In Primary Care - Established       Thank you very much for coming.  It is very nice to see you!    You are doing very very well.  Your blood pressure is controlled.  Your marker for diabetes at 6.8 is good.  Your cholesterol panel is perfect.  Thank you for doing your fasting laboratory examinations, the results were all very good next measurement    You do have a minimal anemia, but your blood count has been the best that it has been over the past 2 years.  Please continue to watch out for easy bruisability, gum or nose bleeding, blood in your urine or stool.  Yes, your anemia could be related to use of WARFARIN.  You may need to undergo endoscopic studies, colonoscopy, but not until next year.    Thank you for taking care of your VACCINATIONS.  The only thing missing at the moment is SHINGLES, which you can do after the holidays, like you planned.    Thank you for seeing your FOOT SPECIALIST!  Being diabetic, you should see your podiatrist at least every 6 months.  Please do not forget to schedule an appointment with OPHTHALMOLOGY before the end of the year.    Please continue to eat sensibly, and stay physically active.  Your weight has been stable, but of course, you could benefit from losing 10 pounds or so.  We can talk more about this when you return in 4 months.    Until then, especially since  "TRULICITY is costing you $200 a month, please look into OZEMPIC or MOUNJARO.  Not only will these medications potentially be cheaper, but they actually work very well for diabetes, and have an added bonus of more significant weight loss than Trulicity.    Again, thank you very much for coming.  Please do not hesitate to call with questions or concerns.  Please continue to take care of yourself and your family, and please continue to pray for our recovery from this pandemic.    Please come back in 4 months.  Do your FASTING laboratory examinations via the [facility], then see me for your yearly COMPLETE physical examination.  Until then, I hope you have a good [place], and a happy new year!  Advanced happy birthday as well!  I did not realize that you are a \"fool!\"  I will not forget your birthday!            0  Return in 4 months.  40 minutes please.  Fasting laboratory examinations via [facility], then see me for COMPLETE physical examination.  Consider further workup for anemia, endoscopic studies.  Consider alternatives to VENLAFAXINE, GLP-1 receptor agonists.  Consider options regarding weight management.  Consider hematology.  Coordinate with cardiology, EP, podiatry, ophthalmology, urology.            0    "

## 2023-12-07 NOTE — PATIENT INSTRUCTIONS
Thank you very much for coming.  It is very nice to see you!    You are doing very very well.  Your blood pressure is controlled.  Your marker for diabetes at 6.8 is good.  Your cholesterol panel is perfect.  Thank you for doing your fasting laboratory examinations, the results were all very good next measurement    You do have a minimal anemia, but your blood count has been the best that it has been over the past 2 years.  Please continue to watch out for easy bruisability, gum or nose bleeding, blood in your urine or stool.  Yes, your anemia could be related to use of WARFARIN.  You may need to undergo endoscopic studies, colonoscopy, but not until next year.    Thank you for taking care of your VACCINATIONS.  The only thing missing at the moment is SHINGLES, which you can do after the holidays, like you planned.    Thank you for seeing your FOOT SPECIALIST!  Being diabetic, you should see your podiatrist at least every 6 months.  Please do not forget to schedule an appointment with OPHTHALMOLOGY before the end of the year.    Please continue to eat sensibly, and stay physically active.  Your weight has been stable, but of course, you could benefit from losing 10 pounds or so.  We can talk more about this when you return in 4 months.    Until then, especially since TRULICITY is costing you $200 a month, please look into OZEMPIC or MOUNJARO.  Not only will these medications potentially be cheaper, but they actually work very well for diabetes, and have an added bonus of more significant weight loss than Trulicity.    Again, thank you very much for coming.  Please do not hesitate to call with questions or concerns.  Please continue to take care of yourself and your family, and please continue to pray for our recovery from this pandemic.    Please come back in 4 months.  Do your FASTING laboratory examinations via the [facility], then see me for your yearly COMPLETE physical examination.  Until then, I hope you  "have a good [place], and a happy new year!  Advanced happy birthday as well!  I did not realize that you are a \"fool!\"  I will not forget your birthday!            0  Return in 4 months.  40 minutes please.  Fasting laboratory examinations via [facility], then see me for COMPLETE physical examination.  Consider further workup for anemia, endoscopic studies.  Consider alternatives to VENLAFAXINE, GLP-1 receptor agonists.  Consider options regarding weight management.  Consider hematology.  Coordinate with cardiology, EP, podiatry, ophthalmology, urology.            0    "

## 2023-12-08 DIAGNOSIS — F41.8 DEPRESSION WITH ANXIETY: ICD-10-CM

## 2023-12-12 RX ORDER — VENLAFAXINE HYDROCHLORIDE 150 MG/1
CAPSULE, EXTENDED RELEASE ORAL
Qty: 90 CAPSULE | Refills: 0 | Status: SHIPPED | OUTPATIENT
Start: 2023-12-12 | End: 2024-04-03

## 2024-01-11 DIAGNOSIS — I10 ESSENTIAL HYPERTENSION: ICD-10-CM

## 2024-01-12 RX ORDER — LOSARTAN POTASSIUM 100 MG/1
100 TABLET ORAL
Qty: 90 TABLET | Refills: 0 | Status: SHIPPED | OUTPATIENT
Start: 2024-01-12 | End: 2024-04-03

## 2024-01-26 DIAGNOSIS — Z79.4 CONTROLLED TYPE 2 DIABETES MELLITUS WITH HYPERGLYCEMIA, WITH LONG-TERM CURRENT USE OF INSULIN (MULTI): ICD-10-CM

## 2024-01-26 DIAGNOSIS — E11.65 CONTROLLED TYPE 2 DIABETES MELLITUS WITH HYPERGLYCEMIA, WITH LONG-TERM CURRENT USE OF INSULIN (MULTI): ICD-10-CM

## 2024-01-30 RX ORDER — PEN NEEDLE, DIABETIC 32 GX 1/4"
NEEDLE, DISPOSABLE MISCELLANEOUS
Qty: 90 EACH | Refills: 3 | Status: SHIPPED | OUTPATIENT
Start: 2024-01-30

## 2024-02-02 DIAGNOSIS — Z79.4 TYPE 2 DIABETES MELLITUS WITH HYPERGLYCEMIA, WITH LONG-TERM CURRENT USE OF INSULIN (MULTI): Primary | ICD-10-CM

## 2024-02-02 DIAGNOSIS — E11.65 TYPE 2 DIABETES MELLITUS WITH HYPERGLYCEMIA, WITH LONG-TERM CURRENT USE OF INSULIN (MULTI): Primary | ICD-10-CM

## 2024-02-02 RX ORDER — IBUPROFEN 200 MG
CAPSULE ORAL
COMMUNITY
End: 2024-02-02 | Stop reason: SDUPTHER

## 2024-02-02 RX ORDER — LANCETS
EACH MISCELLANEOUS
COMMUNITY
End: 2024-02-02 | Stop reason: SDUPTHER

## 2024-02-02 RX ORDER — ISOPROPYL ALCOHOL 70 ML/100ML
SWAB TOPICAL
COMMUNITY
End: 2024-02-02 | Stop reason: SDUPTHER

## 2024-02-02 RX ORDER — INSULIN PUMP SYRINGE, 3 ML
1 EACH MISCELLANEOUS AS NEEDED
COMMUNITY
End: 2024-02-02 | Stop reason: SDUPTHER

## 2024-02-04 RX ORDER — INSULIN PUMP SYRINGE, 3 ML
EACH MISCELLANEOUS
Qty: 1 EACH | Refills: 1 | Status: SHIPPED | OUTPATIENT
Start: 2024-02-04

## 2024-02-04 RX ORDER — IBUPROFEN 200 MG
CAPSULE ORAL
Qty: 200 STRIP | Refills: 3 | Status: SHIPPED | OUTPATIENT
Start: 2024-02-04

## 2024-02-04 RX ORDER — ISOPROPYL ALCOHOL 70 ML/100ML
SWAB TOPICAL
Qty: 200 EACH | Refills: 3 | Status: SHIPPED | OUTPATIENT
Start: 2024-02-04

## 2024-02-04 RX ORDER — LANCETS
EACH MISCELLANEOUS
Qty: 200 EACH | Refills: 3 | Status: SHIPPED | OUTPATIENT
Start: 2024-02-04

## 2024-03-29 DIAGNOSIS — Z79.4 TYPE 2 DIABETES MELLITUS WITH HYPERGLYCEMIA, WITH LONG-TERM CURRENT USE OF INSULIN (MULTI): ICD-10-CM

## 2024-03-29 DIAGNOSIS — F41.8 DEPRESSION WITH ANXIETY: ICD-10-CM

## 2024-03-29 DIAGNOSIS — I10 ESSENTIAL HYPERTENSION: ICD-10-CM

## 2024-03-29 DIAGNOSIS — E78.2 MIXED HYPERLIPIDEMIA: ICD-10-CM

## 2024-03-29 DIAGNOSIS — E11.65 TYPE 2 DIABETES MELLITUS WITH HYPERGLYCEMIA, WITH LONG-TERM CURRENT USE OF INSULIN (MULTI): ICD-10-CM

## 2024-04-03 RX ORDER — METFORMIN HYDROCHLORIDE 850 MG/1
TABLET ORAL
Qty: 270 TABLET | Refills: 0 | Status: SHIPPED | OUTPATIENT
Start: 2024-04-03 | End: 2024-05-10

## 2024-04-03 RX ORDER — ROSUVASTATIN CALCIUM 10 MG/1
TABLET, COATED ORAL
Qty: 90 TABLET | Refills: 1 | Status: SHIPPED | OUTPATIENT
Start: 2024-04-03

## 2024-04-03 RX ORDER — LOSARTAN POTASSIUM 100 MG/1
TABLET ORAL
Qty: 90 TABLET | Refills: 0 | Status: SHIPPED | OUTPATIENT
Start: 2024-04-03 | End: 2024-05-10

## 2024-04-03 RX ORDER — VENLAFAXINE HYDROCHLORIDE 150 MG/1
CAPSULE, EXTENDED RELEASE ORAL
Qty: 90 CAPSULE | Refills: 0 | Status: SHIPPED | OUTPATIENT
Start: 2024-04-03 | End: 2024-05-10

## 2024-04-08 ENCOUNTER — LAB (OUTPATIENT)
Dept: LAB | Facility: LAB | Age: 69
End: 2024-04-08
Payer: MEDICARE

## 2024-04-08 DIAGNOSIS — E55.9 VITAMIN D DEFICIENCY: ICD-10-CM

## 2024-04-08 DIAGNOSIS — E11.65 TYPE 2 DIABETES MELLITUS WITH HYPERGLYCEMIA, WITH LONG-TERM CURRENT USE OF INSULIN (MULTI): ICD-10-CM

## 2024-04-08 DIAGNOSIS — Z79.4 TYPE 2 DIABETES MELLITUS WITH HYPERGLYCEMIA, WITH LONG-TERM CURRENT USE OF INSULIN (MULTI): ICD-10-CM

## 2024-04-08 DIAGNOSIS — Z12.5 SCREENING PSA (PROSTATE SPECIFIC ANTIGEN): ICD-10-CM

## 2024-04-08 DIAGNOSIS — I10 ESSENTIAL HYPERTENSION: ICD-10-CM

## 2024-04-08 DIAGNOSIS — N13.8 BPH WITH OBSTRUCTION/LOWER URINARY TRACT SYMPTOMS: ICD-10-CM

## 2024-04-08 DIAGNOSIS — E66.09 CLASS 1 OBESITY DUE TO EXCESS CALORIES WITH SERIOUS COMORBIDITY AND BODY MASS INDEX (BMI) OF 30.0 TO 30.9 IN ADULT: ICD-10-CM

## 2024-04-08 DIAGNOSIS — N40.1 BPH WITH OBSTRUCTION/LOWER URINARY TRACT SYMPTOMS: ICD-10-CM

## 2024-04-08 DIAGNOSIS — E78.2 MIXED HYPERLIPIDEMIA: ICD-10-CM

## 2024-04-08 DIAGNOSIS — D50.9 IRON DEFICIENCY ANEMIA, UNSPECIFIED IRON DEFICIENCY ANEMIA TYPE: ICD-10-CM

## 2024-04-08 LAB
25(OH)D3 SERPL-MCNC: 27 NG/ML (ref 30–100)
ALBUMIN SERPL BCP-MCNC: 4.1 G/DL (ref 3.4–5)
ALP SERPL-CCNC: 66 U/L (ref 33–136)
ALT SERPL W P-5'-P-CCNC: 16 U/L (ref 10–52)
ANION GAP SERPL CALC-SCNC: 10 MMOL/L (ref 10–20)
APPEARANCE UR: CLEAR
AST SERPL W P-5'-P-CCNC: 14 U/L (ref 9–39)
BASOPHILS # BLD AUTO: 0.07 X10*3/UL (ref 0–0.1)
BASOPHILS NFR BLD AUTO: 1.1 %
BILIRUB SERPL-MCNC: 0.6 MG/DL (ref 0–1.2)
BILIRUB UR STRIP.AUTO-MCNC: NEGATIVE MG/DL
BUN SERPL-MCNC: 13 MG/DL (ref 6–23)
CALCIUM SERPL-MCNC: 9.2 MG/DL (ref 8.6–10.3)
CHLORIDE SERPL-SCNC: 105 MMOL/L (ref 98–107)
CHOLEST SERPL-MCNC: 137 MG/DL (ref 0–199)
CHOLESTEROL/HDL RATIO: 2.8
CO2 SERPL-SCNC: 31 MMOL/L (ref 21–32)
COLOR UR: YELLOW
CREAT SERPL-MCNC: 0.88 MG/DL (ref 0.5–1.3)
CREAT UR-MCNC: 90.8 MG/DL (ref 20–370)
EGFRCR SERPLBLD CKD-EPI 2021: >90 ML/MIN/1.73M*2
EOSINOPHIL # BLD AUTO: 0.6 X10*3/UL (ref 0–0.7)
EOSINOPHIL NFR BLD AUTO: 9.3 %
ERYTHROCYTE [DISTWIDTH] IN BLOOD BY AUTOMATED COUNT: 12.4 % (ref 11.5–14.5)
EST. AVERAGE GLUCOSE BLD GHB EST-MCNC: 143 MG/DL
FERRITIN SERPL-MCNC: 83 NG/ML (ref 20–300)
FOLATE SERPL-MCNC: 18.5 NG/ML
GLUCOSE SERPL-MCNC: 84 MG/DL (ref 74–99)
GLUCOSE UR STRIP.AUTO-MCNC: NEGATIVE MG/DL
HBA1C MFR BLD: 6.6 %
HCT VFR BLD AUTO: 37.8 % (ref 41–52)
HDLC SERPL-MCNC: 49.1 MG/DL
HGB BLD-MCNC: 12.5 G/DL (ref 13.5–17.5)
HOLD SPECIMEN: NORMAL
IMM GRANULOCYTES # BLD AUTO: 0.02 X10*3/UL (ref 0–0.7)
IMM GRANULOCYTES NFR BLD AUTO: 0.3 % (ref 0–0.9)
IRON SATN MFR SERPL: 23 % (ref 25–45)
IRON SERPL-MCNC: 70 UG/DL (ref 35–150)
KETONES UR STRIP.AUTO-MCNC: NEGATIVE MG/DL
LDLC SERPL CALC-MCNC: 66 MG/DL
LEUKOCYTE ESTERASE UR QL STRIP.AUTO: NEGATIVE
LYMPHOCYTES # BLD AUTO: 1.51 X10*3/UL (ref 1.2–4.8)
LYMPHOCYTES NFR BLD AUTO: 23.5 %
MAGNESIUM SERPL-MCNC: 1.9 MG/DL (ref 1.6–2.4)
MCH RBC QN AUTO: 29.3 PG (ref 26–34)
MCHC RBC AUTO-ENTMCNC: 33.1 G/DL (ref 32–36)
MCV RBC AUTO: 89 FL (ref 80–100)
MICROALBUMIN UR-MCNC: <7 MG/L
MICROALBUMIN/CREAT UR: NORMAL MG/G{CREAT}
MONOCYTES # BLD AUTO: 0.47 X10*3/UL (ref 0.1–1)
MONOCYTES NFR BLD AUTO: 7.3 %
NEUTROPHILS # BLD AUTO: 3.75 X10*3/UL (ref 1.2–7.7)
NEUTROPHILS NFR BLD AUTO: 58.5 %
NITRITE UR QL STRIP.AUTO: NEGATIVE
NON HDL CHOLESTEROL: 88 MG/DL (ref 0–149)
NRBC BLD-RTO: 0 /100 WBCS (ref 0–0)
PH UR STRIP.AUTO: 7 [PH]
PLATELET # BLD AUTO: 293 X10*3/UL (ref 150–450)
POTASSIUM SERPL-SCNC: 4.9 MMOL/L (ref 3.5–5.3)
PROT SERPL-MCNC: 6.3 G/DL (ref 6.4–8.2)
PROT UR STRIP.AUTO-MCNC: NEGATIVE MG/DL
RBC # BLD AUTO: 4.27 X10*6/UL (ref 4.5–5.9)
RBC # UR STRIP.AUTO: NEGATIVE /UL
SODIUM SERPL-SCNC: 141 MMOL/L (ref 136–145)
SP GR UR STRIP.AUTO: 1.01
TIBC SERPL-MCNC: 310 UG/DL (ref 240–445)
TRIGL SERPL-MCNC: 112 MG/DL (ref 0–149)
TSH SERPL-ACNC: 1.88 MIU/L (ref 0.44–3.98)
UIBC SERPL-MCNC: 240 UG/DL (ref 110–370)
UROBILINOGEN UR STRIP.AUTO-MCNC: <2 MG/DL
VIT B12 SERPL-MCNC: 243 PG/ML (ref 211–911)
VLDL: 22 MG/DL (ref 0–40)
WBC # BLD AUTO: 6.4 X10*3/UL (ref 4.4–11.3)

## 2024-04-08 PROCEDURE — 82746 ASSAY OF FOLIC ACID SERUM: CPT

## 2024-04-08 PROCEDURE — 36415 COLL VENOUS BLD VENIPUNCTURE: CPT

## 2024-04-08 PROCEDURE — 80061 LIPID PANEL: CPT

## 2024-04-08 PROCEDURE — 82043 UR ALBUMIN QUANTITATIVE: CPT

## 2024-04-08 PROCEDURE — 82306 VITAMIN D 25 HYDROXY: CPT

## 2024-04-08 PROCEDURE — 83735 ASSAY OF MAGNESIUM: CPT

## 2024-04-08 PROCEDURE — 82570 ASSAY OF URINE CREATININE: CPT

## 2024-04-08 PROCEDURE — 82728 ASSAY OF FERRITIN: CPT

## 2024-04-08 PROCEDURE — 83036 HEMOGLOBIN GLYCOSYLATED A1C: CPT

## 2024-04-08 PROCEDURE — 83550 IRON BINDING TEST: CPT

## 2024-04-08 PROCEDURE — 84443 ASSAY THYROID STIM HORMONE: CPT

## 2024-04-08 PROCEDURE — G0103 PSA SCREENING: HCPCS

## 2024-04-08 PROCEDURE — 82607 VITAMIN B-12: CPT

## 2024-04-08 PROCEDURE — 80053 COMPREHEN METABOLIC PANEL: CPT

## 2024-04-08 PROCEDURE — 85025 COMPLETE CBC W/AUTO DIFF WBC: CPT

## 2024-04-08 PROCEDURE — 81003 URINALYSIS AUTO W/O SCOPE: CPT

## 2024-04-08 PROCEDURE — 83540 ASSAY OF IRON: CPT

## 2024-04-11 ENCOUNTER — APPOINTMENT (OUTPATIENT)
Dept: PRIMARY CARE | Facility: CLINIC | Age: 69
End: 2024-04-11
Payer: MEDICARE

## 2024-04-11 ENCOUNTER — OFFICE VISIT (OUTPATIENT)
Dept: PRIMARY CARE | Facility: CLINIC | Age: 69
End: 2024-04-11
Payer: MEDICARE

## 2024-04-11 VITALS
WEIGHT: 214 LBS | BODY MASS INDEX: 31.7 KG/M2 | HEART RATE: 82 BPM | SYSTOLIC BLOOD PRESSURE: 122 MMHG | HEIGHT: 69 IN | DIASTOLIC BLOOD PRESSURE: 73 MMHG

## 2024-04-11 DIAGNOSIS — Z91.89 FRAMINGHAM CARDIAC RISK >20% IN NEXT 10 YEARS: Chronic | ICD-10-CM

## 2024-04-11 DIAGNOSIS — E11.65 TYPE 2 DIABETES MELLITUS WITH HYPERGLYCEMIA, WITH LONG-TERM CURRENT USE OF INSULIN (MULTI): Primary | ICD-10-CM

## 2024-04-11 DIAGNOSIS — N40.1 BPH WITH OBSTRUCTION/LOWER URINARY TRACT SYMPTOMS: ICD-10-CM

## 2024-04-11 DIAGNOSIS — N20.0 NEPHROLITHIASIS: ICD-10-CM

## 2024-04-11 DIAGNOSIS — D50.9 IRON DEFICIENCY ANEMIA, UNSPECIFIED IRON DEFICIENCY ANEMIA TYPE: ICD-10-CM

## 2024-04-11 DIAGNOSIS — Z12.5 SCREENING PSA (PROSTATE SPECIFIC ANTIGEN): ICD-10-CM

## 2024-04-11 DIAGNOSIS — I48.0 PAROXYSMAL ATRIAL FIBRILLATION (MULTI): ICD-10-CM

## 2024-04-11 DIAGNOSIS — Z79.4 TYPE 2 DIABETES MELLITUS WITH HYPERGLYCEMIA, WITH LONG-TERM CURRENT USE OF INSULIN (MULTI): Primary | ICD-10-CM

## 2024-04-11 DIAGNOSIS — E55.9 VITAMIN D DEFICIENCY: ICD-10-CM

## 2024-04-11 DIAGNOSIS — I10 ESSENTIAL HYPERTENSION: ICD-10-CM

## 2024-04-11 DIAGNOSIS — N13.8 BPH WITH OBSTRUCTION/LOWER URINARY TRACT SYMPTOMS: ICD-10-CM

## 2024-04-11 DIAGNOSIS — E66.09 CLASS 1 OBESITY DUE TO EXCESS CALORIES WITH SERIOUS COMORBIDITY AND BODY MASS INDEX (BMI) OF 31.0 TO 31.9 IN ADULT: ICD-10-CM

## 2024-04-11 DIAGNOSIS — F33.42 RECURRENT MAJOR DEPRESSIVE DISORDER, IN FULL REMISSION (CMS-HCC): ICD-10-CM

## 2024-04-11 DIAGNOSIS — E78.2 MIXED HYPERLIPIDEMIA: ICD-10-CM

## 2024-04-11 LAB — PSA SERPL-MCNC: 2.51 NG/ML

## 2024-04-11 PROCEDURE — 1159F MED LIST DOCD IN RCRD: CPT | Performed by: INTERNAL MEDICINE

## 2024-04-11 PROCEDURE — 3008F BODY MASS INDEX DOCD: CPT | Performed by: INTERNAL MEDICINE

## 2024-04-11 PROCEDURE — 3048F LDL-C <100 MG/DL: CPT | Performed by: INTERNAL MEDICINE

## 2024-04-11 PROCEDURE — 3044F HG A1C LEVEL LT 7.0%: CPT | Performed by: INTERNAL MEDICINE

## 2024-04-11 PROCEDURE — 1160F RVW MEDS BY RX/DR IN RCRD: CPT | Performed by: INTERNAL MEDICINE

## 2024-04-11 PROCEDURE — 3074F SYST BP LT 130 MM HG: CPT | Performed by: INTERNAL MEDICINE

## 2024-04-11 PROCEDURE — 1036F TOBACCO NON-USER: CPT | Performed by: INTERNAL MEDICINE

## 2024-04-11 PROCEDURE — 4010F ACE/ARB THERAPY RXD/TAKEN: CPT | Performed by: INTERNAL MEDICINE

## 2024-04-11 PROCEDURE — 99214 OFFICE O/P EST MOD 30 MIN: CPT | Performed by: INTERNAL MEDICINE

## 2024-04-11 PROCEDURE — 3062F POS MACROALBUMINURIA REV: CPT | Performed by: INTERNAL MEDICINE

## 2024-04-11 PROCEDURE — 3078F DIAST BP <80 MM HG: CPT | Performed by: INTERNAL MEDICINE

## 2024-04-11 RX ORDER — VIT C/E/ZN/COPPR/LUTEIN/ZEAXAN 250MG-90MG
25 CAPSULE ORAL DAILY
Qty: 90 CAPSULE | Refills: 1 | COMMUNITY
Start: 2024-04-11 | End: 2024-10-08

## 2024-04-11 RX ORDER — FERROUS GLUCONATE 324(38)MG
1 TABLET ORAL 2 TIMES DAILY
Qty: 180 TABLET | Refills: 3 | Status: SHIPPED | OUTPATIENT
Start: 2024-04-11 | End: 2025-04-11

## 2024-04-11 NOTE — PROGRESS NOTES
"Subjective   Patient ID: Giuseppe Oh is a 69 y.o. male who presents for Annual Exam (Annual Physical).    HPI     Review of Systems    Objective   /73 (BP Location: Left arm, Patient Position: Sitting, BP Cuff Size: Adult)   Pulse 82   Ht 1.753 m (5' 9\")   Wt 97.1 kg (214 lb)   BMI 31.60 kg/m²     Physical Exam    Assessment/Plan   Diagnoses and all orders for this visit:  Type 2 diabetes mellitus with hyperglycemia, with long-term current use of insulin (CMS/HCC)  -     Follow Up In Primary Care - Established  -     Follow Up In Primary Care - [facility]; Future  Mixed hyperlipidemia  -     Follow Up In Primary Care - [facility]; Future  Essential hypertension  -     Follow Up In Primary Care - Established; Future  Paroxysmal atrial fibrillation (CMS/HCC)  -     Follow Up In Primary Care - Established; Future  [place] cardiac risk >20% in next 10 years  Comments:  24.9% 04/24  Orders:  -     Follow Up In Primary Care - [facility]; Future  Class 1 obesity due to excess calories with serious comorbidity and body mass index (BMI) of 31.0 to 31.9 in adult  -     Follow Up In Primary Care - Established; Future  Iron deficiency anemia, unspecified iron deficiency anemia type  -     Referral to Hematology and Oncology; Future  -     ferrous gluconate 324 (38 Fe) mg tablet; Take 1 tablet (324 mg) by mouth 2 times a day.  -     Follow Up In Primary Care - Established; Future  Vitamin D deficiency  -     cholecalciferol (Vitamin D-3) 25 MCG (1000 UT) capsule; Take 1 capsule (25 mcg) by mouth once daily.  -     Follow Up In Primary Care - Established; Future  Recurrent major depressive disorder, in full remission (CMS/HCC)  -     Follow Up In Primary Care - Established; Future  BPH with obstruction/lower urinary tract symptoms  -     Prostate Spec.Ag,Screen; Future  -     Follow Up In Primary Care - Established; Future  Screening PSA (prostate specific antigen)  -     Prostate Spec.Ag,Screen; " Future  -     Follow Up In Primary Care - Established; Future  Nephrolithiasis  -     Follow Up In Primary Care - Established; Future       Thank you very much for coming.  I am very happy to see you.    We did your yearly COMPLETE physical examination.  You seem to be doing well.  Your lungs are relatively clear.  Please let me know if you develop fever 100.5 °F or higher.  Please let me know if you have any colored phlegm.  You may need antibiotics at that point.    In the meantime, drink lots of fluids throughout the day.  Get lots of rest and sleep.  Please do not stay up late at night.  Avoid extremes of temperature.  Please continue taking your MULTIVITAMIN.    Your heart sounds are nice and crisp and regular.  Your blood pressure is controlled.  Your belly is soft.  You seem to be doing relatively well.    Please continue following with your CARDIOLOGIST, as well as your RHYTHM specialist.  Unfortunately, because of your history of atrial fibrillation, we will not be able to use PHENTERMINE/ADIPEX for weight management.    I do understand that you have joined the local [facility], and that you plan to do more physical activity.  BRISK WALKING and other aerobic activities will be best for you, at least 10 minutes every morning, and again 10 minutes in the afternoon, for a minimum of 20 minutes every day.  This will be on top of your regular chores!    Likewise, please maximize diet.  [place] diet book, DASH diet for ideas.    Thank you for doing your LABORATORY examinations.  Your cholesterol panel is close to ideal.  Your marker for diabetes is likewise good at 6.6.    With your IRON DEFICIENCY, and very mild anemia, it is still a good idea for you to see a blood specialist or HEMATOLOGIST.  Otherwise, your other workup is actually pretty good.  There is no blood in your urine.    I do understand that with your history, you would like to know what your PSA has been like.  I have added it to your blood  examinations.  I will send word regarding results and possible changes.    In the meantime, please increase your VITAMIN D to every day, always with food.  You should be okay taking vitamin D every day, but watch out for constipation.  Again, lots of fluids throughout the day.    Again, thank you very much for coming.  I would like to see you in 4 months, just to make sure that you are doing well with our plans, especially plans of maintaining and hopefully losing weight.  Until then, please continue to take care of yourself and your family, and please continue to pray for our recovery from this pandemic.  I hope you had a good Easter Sunday, and a good eclipse during day!            0  Return in 4 months.  20 minutes please.  Reassess mood, energy, function, preventive strategies, efforts regarding weight management.  Not good candidate for phentermine/Adipex at this time, but might benefit from switching out GLP-1 RA if eligible.  Coordinate with nephrology, cardiology/EP, podiatry, ophthalmology, urology.            0

## 2024-04-11 NOTE — PATIENT INSTRUCTIONS
Thank you very much for coming.  I am very happy to see you.    We did your yearly COMPLETE physical examination.  You seem to be doing well.  Your lungs are relatively clear.  Please let me know if you develop fever 100.5 °F or higher.  Please let me know if you have any colored phlegm.  You may need antibiotics at that point.    In the meantime, drink lots of fluids throughout the day.  Get lots of rest and sleep.  Please do not stay up late at night.  Avoid extremes of temperature.  Please continue taking your MULTIVITAMIN.    Your heart sounds are nice and crisp and regular.  Your blood pressure is controlled.  Your belly is soft.  You seem to be doing relatively well.    Please continue following with your CARDIOLOGIST, as well as your RHYTHM specialist.  Unfortunately, because of your history of atrial fibrillation, we will not be able to use PHENTERMINE/ADIPEX for weight management.    I do understand that you have joined the local [facility], and that you plan to do more physical activity.  BRISK WALKING and other aerobic activities will be best for you, at least 10 minutes every morning, and again 10 minutes in the afternoon, for a minimum of 20 minutes every day.  This will be on top of your regular chores!    Likewise, please maximize diet.  [place] diet book, DASH diet for ideas.    Thank you for doing your LABORATORY examinations.  Your cholesterol panel is close to ideal.  Your marker for diabetes is likewise good at 6.6.    With your IRON DEFICIENCY, and very mild anemia, it is still a good idea for you to see a blood specialist or HEMATOLOGIST.  Otherwise, your other workup is actually pretty good.  There is no blood in your urine.    I do understand that with your history, you would like to know what your PSA has been like.  I have added it to your blood examinations.  I will send word regarding results and possible changes.    In the meantime, please increase your VITAMIN D to every day, always with  food.  You should be okay taking vitamin D every day, but watch out for constipation.  Again, lots of fluids throughout the day.    Again, thank you very much for coming.  I would like to see you in 4 months, just to make sure that you are doing well with our plans, especially plans of maintaining and hopefully losing weight.  Until then, please continue to take care of yourself and your family, and please continue to pray for our recovery from this pandemic.  I hope you had a good Easter Sunday, and a good eclipse during day!            0  Return in 4 months.  20 minutes please.  Reassess mood, energy, function, preventive strategies, efforts regarding weight management.  Not good candidate for phentermine/Adipex at this time, but might benefit from switching out GLP-1 RA if eligible.  Coordinate with nephrology, cardiology/EP, podiatry, ophthalmology, urology.            0

## 2024-05-09 DIAGNOSIS — F41.8 DEPRESSION WITH ANXIETY: ICD-10-CM

## 2024-05-09 DIAGNOSIS — E11.65 TYPE 2 DIABETES MELLITUS WITH HYPERGLYCEMIA, WITH LONG-TERM CURRENT USE OF INSULIN (MULTI): ICD-10-CM

## 2024-05-09 DIAGNOSIS — I10 ESSENTIAL HYPERTENSION: ICD-10-CM

## 2024-05-09 DIAGNOSIS — Z79.4 TYPE 2 DIABETES MELLITUS WITH HYPERGLYCEMIA, WITH LONG-TERM CURRENT USE OF INSULIN (MULTI): ICD-10-CM

## 2024-05-10 DIAGNOSIS — E11.65 CONTROLLED TYPE 2 DIABETES MELLITUS WITH HYPERGLYCEMIA, WITHOUT LONG-TERM CURRENT USE OF INSULIN (MULTI): ICD-10-CM

## 2024-05-10 RX ORDER — DULAGLUTIDE 1.5 MG/.5ML
1.5 INJECTION, SOLUTION SUBCUTANEOUS
Qty: 12 EACH | Refills: 0 | Status: SHIPPED | OUTPATIENT
Start: 2024-05-12

## 2024-05-10 RX ORDER — LOSARTAN POTASSIUM 100 MG/1
TABLET ORAL
Qty: 90 TABLET | Refills: 0 | Status: SHIPPED | OUTPATIENT
Start: 2024-05-10

## 2024-05-10 RX ORDER — METFORMIN HYDROCHLORIDE 850 MG/1
TABLET ORAL
Qty: 270 TABLET | Refills: 0 | Status: SHIPPED | OUTPATIENT
Start: 2024-05-10

## 2024-05-10 RX ORDER — VENLAFAXINE HYDROCHLORIDE 150 MG/1
CAPSULE, EXTENDED RELEASE ORAL
Qty: 90 CAPSULE | Refills: 0 | Status: SHIPPED | OUTPATIENT
Start: 2024-05-10

## 2024-06-17 DIAGNOSIS — E11.65 CONTROLLED TYPE 2 DIABETES MELLITUS WITH HYPERGLYCEMIA, WITHOUT LONG-TERM CURRENT USE OF INSULIN (MULTI): ICD-10-CM

## 2024-06-19 RX ORDER — DULAGLUTIDE 1.5 MG/.5ML
INJECTION, SOLUTION SUBCUTANEOUS
Qty: 6 ML | Refills: 0 | Status: SHIPPED | OUTPATIENT
Start: 2024-06-19

## 2024-06-21 DIAGNOSIS — Z79.4 TYPE 2 DIABETES MELLITUS WITH HYPERGLYCEMIA, WITH LONG-TERM CURRENT USE OF INSULIN (MULTI): ICD-10-CM

## 2024-06-21 DIAGNOSIS — E11.65 TYPE 2 DIABETES MELLITUS WITH HYPERGLYCEMIA, WITH LONG-TERM CURRENT USE OF INSULIN (MULTI): ICD-10-CM

## 2024-06-21 DIAGNOSIS — E11.65 CONTROLLED TYPE 2 DIABETES MELLITUS WITH HYPERGLYCEMIA, WITHOUT LONG-TERM CURRENT USE OF INSULIN (MULTI): ICD-10-CM

## 2024-06-25 RX ORDER — DULAGLUTIDE 1.5 MG/.5ML
INJECTION, SOLUTION SUBCUTANEOUS
Qty: 6 ML | Refills: 0 | Status: SHIPPED | OUTPATIENT
Start: 2024-06-25

## 2024-06-25 RX ORDER — INSULIN DEGLUDEC 100 U/ML
INJECTION, SOLUTION SUBCUTANEOUS
Qty: 45 ML | Refills: 1 | Status: SHIPPED | OUTPATIENT
Start: 2024-06-25

## 2024-06-27 DIAGNOSIS — E11.65 CONTROLLED TYPE 2 DIABETES MELLITUS WITH HYPERGLYCEMIA, WITHOUT LONG-TERM CURRENT USE OF INSULIN (MULTI): ICD-10-CM

## 2024-06-27 DIAGNOSIS — E78.2 MIXED HYPERLIPIDEMIA: ICD-10-CM

## 2024-07-02 RX ORDER — ROSUVASTATIN CALCIUM 10 MG/1
TABLET, COATED ORAL
Qty: 90 TABLET | Refills: 1 | Status: SHIPPED | OUTPATIENT
Start: 2024-07-02

## 2024-07-02 RX ORDER — DULAGLUTIDE 1.5 MG/.5ML
INJECTION, SOLUTION SUBCUTANEOUS
Qty: 6 ML | Refills: 0 | Status: SHIPPED | OUTPATIENT
Start: 2024-07-02

## 2024-07-19 DIAGNOSIS — I10 ESSENTIAL HYPERTENSION: ICD-10-CM

## 2024-07-19 RX ORDER — LOSARTAN POTASSIUM 100 MG/1
100 TABLET ORAL DAILY
Qty: 90 TABLET | Refills: 0 | Status: SHIPPED | OUTPATIENT
Start: 2024-07-19

## 2024-08-14 ENCOUNTER — APPOINTMENT (OUTPATIENT)
Dept: PRIMARY CARE | Facility: CLINIC | Age: 69
End: 2024-08-14
Payer: MEDICARE

## 2024-08-14 VITALS
HEIGHT: 69 IN | OXYGEN SATURATION: 96 % | SYSTOLIC BLOOD PRESSURE: 123 MMHG | WEIGHT: 208.6 LBS | BODY MASS INDEX: 30.9 KG/M2 | DIASTOLIC BLOOD PRESSURE: 67 MMHG | HEART RATE: 73 BPM

## 2024-08-14 DIAGNOSIS — E78.2 MIXED HYPERLIPIDEMIA: ICD-10-CM

## 2024-08-14 DIAGNOSIS — N20.0 NEPHROLITHIASIS: ICD-10-CM

## 2024-08-14 DIAGNOSIS — E55.9 VITAMIN D DEFICIENCY: ICD-10-CM

## 2024-08-14 DIAGNOSIS — F33.42 RECURRENT MAJOR DEPRESSIVE DISORDER, IN FULL REMISSION (CMS-HCC): ICD-10-CM

## 2024-08-14 DIAGNOSIS — Z91.89 FRAMINGHAM CARDIAC RISK >20% IN NEXT 10 YEARS: Chronic | ICD-10-CM

## 2024-08-14 DIAGNOSIS — E11.65 TYPE 2 DIABETES MELLITUS WITH HYPERGLYCEMIA, WITH LONG-TERM CURRENT USE OF INSULIN (MULTI): Primary | ICD-10-CM

## 2024-08-14 DIAGNOSIS — E66.09 CLASS 1 OBESITY DUE TO EXCESS CALORIES WITH SERIOUS COMORBIDITY AND BODY MASS INDEX (BMI) OF 30.0 TO 30.9 IN ADULT: ICD-10-CM

## 2024-08-14 DIAGNOSIS — D50.9 IRON DEFICIENCY ANEMIA, UNSPECIFIED IRON DEFICIENCY ANEMIA TYPE: ICD-10-CM

## 2024-08-14 DIAGNOSIS — N40.1 BPH WITH OBSTRUCTION/LOWER URINARY TRACT SYMPTOMS: ICD-10-CM

## 2024-08-14 DIAGNOSIS — I48.0 PAROXYSMAL ATRIAL FIBRILLATION (MULTI): ICD-10-CM

## 2024-08-14 DIAGNOSIS — Z79.4 TYPE 2 DIABETES MELLITUS WITH HYPERGLYCEMIA, WITH LONG-TERM CURRENT USE OF INSULIN (MULTI): Primary | ICD-10-CM

## 2024-08-14 DIAGNOSIS — I10 ESSENTIAL HYPERTENSION: ICD-10-CM

## 2024-08-14 DIAGNOSIS — N13.8 BPH WITH OBSTRUCTION/LOWER URINARY TRACT SYMPTOMS: ICD-10-CM

## 2024-08-14 PROCEDURE — 4010F ACE/ARB THERAPY RXD/TAKEN: CPT | Performed by: INTERNAL MEDICINE

## 2024-08-14 PROCEDURE — 99213 OFFICE O/P EST LOW 20 MIN: CPT | Performed by: INTERNAL MEDICINE

## 2024-08-14 PROCEDURE — 3008F BODY MASS INDEX DOCD: CPT | Performed by: INTERNAL MEDICINE

## 2024-08-14 PROCEDURE — 3044F HG A1C LEVEL LT 7.0%: CPT | Performed by: INTERNAL MEDICINE

## 2024-08-14 PROCEDURE — 1159F MED LIST DOCD IN RCRD: CPT | Performed by: INTERNAL MEDICINE

## 2024-08-14 PROCEDURE — 3074F SYST BP LT 130 MM HG: CPT | Performed by: INTERNAL MEDICINE

## 2024-08-14 PROCEDURE — 1036F TOBACCO NON-USER: CPT | Performed by: INTERNAL MEDICINE

## 2024-08-14 PROCEDURE — 3078F DIAST BP <80 MM HG: CPT | Performed by: INTERNAL MEDICINE

## 2024-08-14 PROCEDURE — 3062F POS MACROALBUMINURIA REV: CPT | Performed by: INTERNAL MEDICINE

## 2024-08-14 PROCEDURE — 3048F LDL-C <100 MG/DL: CPT | Performed by: INTERNAL MEDICINE

## 2024-08-14 PROCEDURE — 1160F RVW MEDS BY RX/DR IN RCRD: CPT | Performed by: INTERNAL MEDICINE

## 2024-08-14 RX ORDER — METFORMIN HYDROCHLORIDE 850 MG/1
TABLET ORAL
Qty: 270 TABLET | Refills: 0 | Status: SHIPPED | OUTPATIENT
Start: 2024-08-14

## 2024-08-14 RX ORDER — TAMSULOSIN HYDROCHLORIDE 0.4 MG/1
0.4 CAPSULE ORAL NIGHTLY
Qty: 90 CAPSULE | Refills: 0 | Status: SHIPPED | OUTPATIENT
Start: 2024-08-14

## 2024-08-14 RX ORDER — DULAGLUTIDE 1.5 MG/.5ML
INJECTION, SOLUTION SUBCUTANEOUS
Qty: 6 ML | Refills: 0 | Status: SHIPPED | OUTPATIENT
Start: 2024-08-14

## 2024-08-14 RX ORDER — VENLAFAXINE HYDROCHLORIDE 150 MG/1
CAPSULE, EXTENDED RELEASE ORAL
Qty: 90 CAPSULE | Refills: 0 | Status: SHIPPED | OUTPATIENT
Start: 2024-08-14

## 2024-08-14 NOTE — PROGRESS NOTES
"Subjective   Patient ID: Giuseppe Oh is a 69 y.o. male who presents for Follow-up (Patient presented today for a 4 month follow up./).    HPI   The patient is due for repeat FASTING laboratory examinations.  In the meantime, compliant with medications, tolerating regimens.  Seen by cardiology, and has been found to be hemodynamically stable with no further changes recommended.  To be seen by EP this week as well.  No cameron substernal chest pain, no orthopnea, no paroxysmal nocturnal dyspnea.  No dizziness, diaphoresis, palpitations.  No loss of consciousness.  No bipedal edema.  No particular dyspnea on exertion.    Physically active.  Trying to eat more sensibly.  Has had interruptions of his TRULICITY because of supply issues.  ENDOCRINE with no polyuria, polydipsia, polyphagia.  No blurred vision.  No skin, hair, nail changes.  No dramatic weight loss or weight gain.      No gum or nose bleeding.  No easy bruisability.  No change in bowel or bladder character or habits.  No dyspepsia.  No apparent blood in urine or stool.  Continues to want to improve quality of life, and does not wish harm to self or others.  No particular cough, no particular sputum production.  CONSTITUTIONALLY, no fever, no chills.  No night sweats.  No lingering anorexia or nausea.  No apparent lymphadenopathy.  No apparent weight loss.        Review of Systems  Review of systems as in history of present illness, and otherwise, reviewed separately as well, and was unremarkable/negative/noncontributory.        Objective   /67 (BP Location: Left arm, Patient Position: Sitting, BP Cuff Size: Adult)   Pulse 73   Ht 1.753 m (5' 9\")   Wt 94.6 kg (208 lb 9.6 oz)   SpO2 96%   BMI 30.80 kg/m²     Physical Exam  In good spirits.  Not in distress or diaphoresis.  Alert, oriented x 3.  Amiable.  Not unkempt.  Receptive, cheerful, appropriate.  Eager to maintain and hopefully improve quality of life.  Does not wish harm to self or " others.    HEAD pink palpebral conjunctivae, anicteric sclerae.  NECK supple, no apparent jugular venous distention.  CARDIOVASCULAR not in distress or diaphoresis.  No bipedal edema.  LUNGS not in distress or diaphoresis.  Not using accessory muscles.  ABDOMEN soft, nontender.  BACK no costovertebral angle tenderness.  EXTREMITIES no clubbing, no cyanosis.  NEURO no facial asymmetry.  No apparent cranial nerve deficits.  Romberg negative.  Ambulating without need of assistance.  No apparent focal weakness.  No tremors.  PSYCH receptive, appropriate, and eager to maintain and improve quality of life.        LABORATORY results reviewed, to be updated soon.        Assessment/Plan   Diagnoses and all orders for this visit:  Type 2 diabetes mellitus with hyperglycemia, with long-term current use of insulin (Multi)  -     Follow Up In Primary Care - Established  -     dulaglutide (Trulicity) 1.5 mg/0.5 mL pen injector injection; INJECT 0.5ML (=1.5 MG)     SUBCUTANEOUSLY ONCE WEEKLY  -     metFORMIN (Glucophage) 850 mg tablet; Please take 1 tablet by mouth with food every day, always with food please.  Lots of fluids throughout the day.  Thank you.  -     Follow Up In Primary Care - Established; Future  -     Comprehensive Metabolic Panel; Future  -     Urinalysis with Reflex Culture and Microscopic; Future  -     Hemoglobin A1C; Future  Mixed hyperlipidemia  -     Follow Up In Primary Care - Established; Future  -     Comprehensive Metabolic Panel; Future  -     Lipid Panel; Future  Essential hypertension  -     Follow Up In Primary Care - Established; Future  -     CBC and Auto Differential; Future  -     Comprehensive Metabolic Panel; Future  -     TSH with reflex to Free T4 if abnormal; Future  -     Magnesium; Future  -     Urinalysis with Reflex Culture and Microscopic; Future  Paroxysmal atrial fibrillation (Multi)  -     Follow Up In Primary Care - Established; Future  -     CBC and Auto Differential; Future  -      Comprehensive Metabolic Panel; Future  -     TSH with reflex to Free T4 if abnormal; Future  -     Magnesium; Future  [place] cardiac risk >20% in next 10 years  Comments:  24.9% 04/24  Orders:  -     Follow Up In Primary Care - Established; Future  -     Comprehensive Metabolic Panel; Future  -     Urinalysis with Reflex Culture and Microscopic; Future  -     Hemoglobin A1C; Future  Class 1 obesity due to excess calories with serious comorbidity and body mass index (BMI) of 30.0 to 30.9 in adult  -     Follow Up In Primary Care - Established; Future  -     Comprehensive Metabolic Panel; Future  -     TSH with reflex to Free T4 if abnormal; Future  Iron deficiency anemia, unspecified iron deficiency anemia type  -     Follow Up In Primary Care - Established; Future  -     CBC and Auto Differential; Future  -     Urinalysis with Reflex Culture and Microscopic; Future  Vitamin D deficiency  -     Follow Up In Primary Care - Established; Future  -     Comprehensive Metabolic Panel; Future  -     Vitamin D 25-Hydroxy,Total (for eval of Vitamin D levels); Future  Recurrent major depressive disorder, in full remission (CMS-HCC)  -     venlafaxine XR (Effexor-XR) 150 mg 24 hr capsule; Please take 1 tablet by mouth with LUNCH every day.  Do not crush or chew.  Thank you.  -     Follow Up In Primary Care - Established; Future  -     Comprehensive Metabolic Panel; Future  -     TSH with reflex to Free T4 if abnormal; Future  Nephrolithiasis  -     Follow Up In Primary Care - Established; Future  -     CBC and Auto Differential; Future  -     Comprehensive Metabolic Panel; Future  -     Urinalysis with Reflex Culture and Microscopic; Future  BPH with obstruction/lower urinary tract symptoms  -     tamsulosin (Flomax) 0.4 mg 24 hr capsule; Take 1 capsule (0.4 mg) by mouth once daily at bedtime.  -     Follow Up In Primary Care - Established; Future  -     Comprehensive Metabolic Panel; Future  -     Urinalysis with Reflex  Culture and Microscopic; Future       Thank you very much for coming.  I am very happy to see you.    Please do FASTING laboratory examinations tomorrow morning.  Fasting in this case means nothing to eat, and no milk, no sugar, no cream.  Please drink lots of water so that you can submit a urine sample, and so that your blood vessels will be plump.    Let me send word regarding results and possible changes.    In the meantime, you could benefit from a medication called PHENTERMINE/ADIPEX, as long as it is safe for you to take it.  When you see your EP cardiologist this Friday, please ask him about this medication.    I hope you do not run out of TRULICITY, but if they are unable to fill this prescription, please let me know.    Let me see you again in 5 weeks.  Until then, please continue to take care of yourself and your family, and please continue to pray for our recovery from this pandemic.  Take care and God bless.            0  Return in 5 weeks.  20 minutes please.  Reassess options for weight management, perhaps including phentermine/Adipex if BP allows.  Consider SGLT2 in addition to metformin, GLP-1 RA.  Review preventive strategies, prepare for Medicare wellness in OCTOBER.  Coordinate with cardiology/EP, ophthalmology, urology, nephrology, podiatry, as patient is seen.            0

## 2024-08-14 NOTE — PATIENT INSTRUCTIONS
Thank you very much for coming.  I am very happy to see you.    Please do FASTING laboratory examinations tomorrow morning.  Fasting in this case means nothing to eat, and no milk, no sugar, no cream.  Please drink lots of water so that you can submit a urine sample, and so that your blood vessels will be plump.    Let me send word regarding results and possible changes.    In the meantime, you could benefit from a medication called PHENTERMINE/ADIPEX, as long as it is safe for you to take it.  When you see your EP cardiologist this Friday, please ask him about this medication.    I hope you do not run out of TRULICITY, but if they are unable to fill this prescription, please let me know.    Let me see you again in 5 weeks.  Until then, please continue to take care of yourself and your family, and please continue to pray for our recovery from this pandemic.  Take care and God bless.            0  Return in 5 weeks.  20 minutes please.  Reassess options for weight management, perhaps including phentermine/Adipex if BP allows.  Consider SGLT2 in addition to metformin, GLP-1 RA.  Review preventive strategies, prepare for Medicare wellness in OCTOBER.  Coordinate with cardiology/EP, ophthalmology, urology, nephrology, podiatry, as patient is seen.            0

## 2024-08-15 ENCOUNTER — LAB (OUTPATIENT)
Dept: LAB | Facility: LAB | Age: 69
End: 2024-08-15
Payer: MEDICARE

## 2024-08-15 DIAGNOSIS — E55.9 VITAMIN D DEFICIENCY: ICD-10-CM

## 2024-08-15 DIAGNOSIS — E78.2 MIXED HYPERLIPIDEMIA: ICD-10-CM

## 2024-08-15 DIAGNOSIS — D50.9 IRON DEFICIENCY ANEMIA, UNSPECIFIED IRON DEFICIENCY ANEMIA TYPE: ICD-10-CM

## 2024-08-15 DIAGNOSIS — I10 ESSENTIAL HYPERTENSION: ICD-10-CM

## 2024-08-15 DIAGNOSIS — Z79.4 TYPE 2 DIABETES MELLITUS WITH HYPERGLYCEMIA, WITH LONG-TERM CURRENT USE OF INSULIN (MULTI): ICD-10-CM

## 2024-08-15 DIAGNOSIS — N40.1 BPH WITH OBSTRUCTION/LOWER URINARY TRACT SYMPTOMS: ICD-10-CM

## 2024-08-15 DIAGNOSIS — I48.0 PAROXYSMAL ATRIAL FIBRILLATION (MULTI): ICD-10-CM

## 2024-08-15 DIAGNOSIS — Z91.89 FRAMINGHAM CARDIAC RISK >20% IN NEXT 10 YEARS: ICD-10-CM

## 2024-08-15 DIAGNOSIS — N13.8 BPH WITH OBSTRUCTION/LOWER URINARY TRACT SYMPTOMS: ICD-10-CM

## 2024-08-15 DIAGNOSIS — E66.09 CLASS 1 OBESITY DUE TO EXCESS CALORIES WITH SERIOUS COMORBIDITY AND BODY MASS INDEX (BMI) OF 30.0 TO 30.9 IN ADULT: ICD-10-CM

## 2024-08-15 DIAGNOSIS — E11.65 TYPE 2 DIABETES MELLITUS WITH HYPERGLYCEMIA, WITH LONG-TERM CURRENT USE OF INSULIN (MULTI): ICD-10-CM

## 2024-08-15 DIAGNOSIS — N20.0 NEPHROLITHIASIS: ICD-10-CM

## 2024-08-15 DIAGNOSIS — F33.42 RECURRENT MAJOR DEPRESSIVE DISORDER, IN FULL REMISSION (CMS-HCC): ICD-10-CM

## 2024-08-15 LAB
25(OH)D3 SERPL-MCNC: 45 NG/ML (ref 30–100)
ALBUMIN SERPL BCP-MCNC: 3.8 G/DL (ref 3.4–5)
ALP SERPL-CCNC: 70 U/L (ref 33–136)
ALT SERPL W P-5'-P-CCNC: 17 U/L (ref 10–52)
ANION GAP SERPL CALC-SCNC: 11 MMOL/L (ref 10–20)
APPEARANCE UR: CLEAR
AST SERPL W P-5'-P-CCNC: 15 U/L (ref 9–39)
BASOPHILS # BLD AUTO: 0.09 X10*3/UL (ref 0–0.1)
BASOPHILS NFR BLD AUTO: 1.2 %
BILIRUB SERPL-MCNC: 0.5 MG/DL (ref 0–1.2)
BILIRUB UR STRIP.AUTO-MCNC: NEGATIVE MG/DL
BUN SERPL-MCNC: 13 MG/DL (ref 6–23)
CALCIUM SERPL-MCNC: 9 MG/DL (ref 8.6–10.3)
CAOX CRY #/AREA UR COMP ASSIST: NORMAL /HPF
CHLORIDE SERPL-SCNC: 106 MMOL/L (ref 98–107)
CHOLEST SERPL-MCNC: 117 MG/DL (ref 0–199)
CHOLESTEROL/HDL RATIO: 2.5
CO2 SERPL-SCNC: 29 MMOL/L (ref 21–32)
COLOR UR: ABNORMAL
CREAT SERPL-MCNC: 0.9 MG/DL (ref 0.5–1.3)
EGFRCR SERPLBLD CKD-EPI 2021: >90 ML/MIN/1.73M*2
EOSINOPHIL # BLD AUTO: 0.64 X10*3/UL (ref 0–0.7)
EOSINOPHIL NFR BLD AUTO: 8.5 %
ERYTHROCYTE [DISTWIDTH] IN BLOOD BY AUTOMATED COUNT: 12.4 % (ref 11.5–14.5)
EST. AVERAGE GLUCOSE BLD GHB EST-MCNC: 134 MG/DL
GLUCOSE SERPL-MCNC: 91 MG/DL (ref 74–99)
GLUCOSE UR STRIP.AUTO-MCNC: NORMAL MG/DL
HBA1C MFR BLD: 6.3 %
HCT VFR BLD AUTO: 37.9 % (ref 41–52)
HDLC SERPL-MCNC: 46.3 MG/DL
HGB BLD-MCNC: 12.3 G/DL (ref 13.5–17.5)
HOLD SPECIMEN: NORMAL
IMM GRANULOCYTES # BLD AUTO: 0.04 X10*3/UL (ref 0–0.7)
IMM GRANULOCYTES NFR BLD AUTO: 0.5 % (ref 0–0.9)
KETONES UR STRIP.AUTO-MCNC: NEGATIVE MG/DL
LDLC SERPL CALC-MCNC: 53 MG/DL
LEUKOCYTE ESTERASE UR QL STRIP.AUTO: NEGATIVE
LYMPHOCYTES # BLD AUTO: 1.69 X10*3/UL (ref 1.2–4.8)
LYMPHOCYTES NFR BLD AUTO: 22.5 %
MAGNESIUM SERPL-MCNC: 1.71 MG/DL (ref 1.6–2.4)
MCH RBC QN AUTO: 28.8 PG (ref 26–34)
MCHC RBC AUTO-ENTMCNC: 32.5 G/DL (ref 32–36)
MCV RBC AUTO: 89 FL (ref 80–100)
MONOCYTES # BLD AUTO: 0.38 X10*3/UL (ref 0.1–1)
MONOCYTES NFR BLD AUTO: 5.1 %
MUCOUS THREADS #/AREA URNS AUTO: NORMAL /LPF
NEUTROPHILS # BLD AUTO: 4.68 X10*3/UL (ref 1.2–7.7)
NEUTROPHILS NFR BLD AUTO: 62.2 %
NITRITE UR QL STRIP.AUTO: NEGATIVE
NON HDL CHOLESTEROL: 71 MG/DL (ref 0–149)
NRBC BLD-RTO: 0 /100 WBCS (ref 0–0)
PH UR STRIP.AUTO: 5.5 [PH]
PLATELET # BLD AUTO: 287 X10*3/UL (ref 150–450)
POTASSIUM SERPL-SCNC: 4.6 MMOL/L (ref 3.5–5.3)
PROT SERPL-MCNC: 6 G/DL (ref 6.4–8.2)
PROT UR STRIP.AUTO-MCNC: NEGATIVE MG/DL
RBC # BLD AUTO: 4.27 X10*6/UL (ref 4.5–5.9)
RBC # UR STRIP.AUTO: ABNORMAL /UL
RBC #/AREA URNS AUTO: NORMAL /HPF
SODIUM SERPL-SCNC: 141 MMOL/L (ref 136–145)
SP GR UR STRIP.AUTO: 1.01
TRIGL SERPL-MCNC: 88 MG/DL (ref 0–149)
TSH SERPL-ACNC: 1.07 MIU/L (ref 0.44–3.98)
UROBILINOGEN UR STRIP.AUTO-MCNC: NORMAL MG/DL
VLDL: 18 MG/DL (ref 0–40)
WBC # BLD AUTO: 7.5 X10*3/UL (ref 4.4–11.3)
WBC #/AREA URNS AUTO: NORMAL /HPF

## 2024-08-15 PROCEDURE — 83036 HEMOGLOBIN GLYCOSYLATED A1C: CPT

## 2024-08-15 PROCEDURE — 81001 URINALYSIS AUTO W/SCOPE: CPT

## 2024-08-15 PROCEDURE — 80053 COMPREHEN METABOLIC PANEL: CPT

## 2024-08-15 PROCEDURE — 84443 ASSAY THYROID STIM HORMONE: CPT

## 2024-08-15 PROCEDURE — 85025 COMPLETE CBC W/AUTO DIFF WBC: CPT

## 2024-08-15 PROCEDURE — 36415 COLL VENOUS BLD VENIPUNCTURE: CPT

## 2024-08-15 PROCEDURE — 82306 VITAMIN D 25 HYDROXY: CPT

## 2024-08-15 PROCEDURE — 80061 LIPID PANEL: CPT

## 2024-08-15 PROCEDURE — 83735 ASSAY OF MAGNESIUM: CPT

## 2024-08-19 DIAGNOSIS — Z79.4 TYPE 2 DIABETES MELLITUS WITH HYPERGLYCEMIA, WITH LONG-TERM CURRENT USE OF INSULIN (MULTI): ICD-10-CM

## 2024-08-19 DIAGNOSIS — E11.65 TYPE 2 DIABETES MELLITUS WITH HYPERGLYCEMIA, WITH LONG-TERM CURRENT USE OF INSULIN (MULTI): ICD-10-CM

## 2024-08-20 RX ORDER — DULAGLUTIDE 1.5 MG/.5ML
INJECTION, SOLUTION SUBCUTANEOUS
Qty: 6 ML | Refills: 0 | Status: SHIPPED | OUTPATIENT
Start: 2024-08-20

## 2024-09-19 ENCOUNTER — APPOINTMENT (OUTPATIENT)
Dept: PRIMARY CARE | Facility: CLINIC | Age: 69
End: 2024-09-19
Payer: MEDICARE

## 2024-09-19 VITALS
WEIGHT: 207.3 LBS | BODY MASS INDEX: 30.7 KG/M2 | HEIGHT: 69 IN | OXYGEN SATURATION: 96 % | DIASTOLIC BLOOD PRESSURE: 60 MMHG | HEART RATE: 72 BPM | SYSTOLIC BLOOD PRESSURE: 121 MMHG

## 2024-09-19 DIAGNOSIS — E55.9 VITAMIN D DEFICIENCY: ICD-10-CM

## 2024-09-19 DIAGNOSIS — N40.1 BPH WITH OBSTRUCTION/LOWER URINARY TRACT SYMPTOMS: ICD-10-CM

## 2024-09-19 DIAGNOSIS — D50.9 IRON DEFICIENCY ANEMIA, UNSPECIFIED IRON DEFICIENCY ANEMIA TYPE: ICD-10-CM

## 2024-09-19 DIAGNOSIS — N20.0 NEPHROLITHIASIS: ICD-10-CM

## 2024-09-19 DIAGNOSIS — I10 ESSENTIAL HYPERTENSION: ICD-10-CM

## 2024-09-19 DIAGNOSIS — E11.65 TYPE 2 DIABETES MELLITUS WITH HYPERGLYCEMIA, WITH LONG-TERM CURRENT USE OF INSULIN: Primary | ICD-10-CM

## 2024-09-19 DIAGNOSIS — F33.42 RECURRENT MAJOR DEPRESSIVE DISORDER, IN FULL REMISSION (CMS-HCC): ICD-10-CM

## 2024-09-19 DIAGNOSIS — Z91.89 FRAMINGHAM CARDIAC RISK >20% IN NEXT 10 YEARS: Chronic | ICD-10-CM

## 2024-09-19 DIAGNOSIS — E66.09 CLASS 1 OBESITY DUE TO EXCESS CALORIES WITH SERIOUS COMORBIDITY AND BODY MASS INDEX (BMI) OF 30.0 TO 30.9 IN ADULT: ICD-10-CM

## 2024-09-19 DIAGNOSIS — Z79.4 TYPE 2 DIABETES MELLITUS WITH HYPERGLYCEMIA, WITH LONG-TERM CURRENT USE OF INSULIN: Primary | ICD-10-CM

## 2024-09-19 DIAGNOSIS — N13.8 BPH WITH OBSTRUCTION/LOWER URINARY TRACT SYMPTOMS: ICD-10-CM

## 2024-09-19 DIAGNOSIS — E78.2 MIXED HYPERLIPIDEMIA: ICD-10-CM

## 2024-09-19 DIAGNOSIS — I48.0 PAROXYSMAL ATRIAL FIBRILLATION (MULTI): ICD-10-CM

## 2024-09-19 DIAGNOSIS — L84 CALLUS OF FOOT: ICD-10-CM

## 2024-09-19 PROCEDURE — 3078F DIAST BP <80 MM HG: CPT | Performed by: INTERNAL MEDICINE

## 2024-09-19 PROCEDURE — 1159F MED LIST DOCD IN RCRD: CPT | Performed by: INTERNAL MEDICINE

## 2024-09-19 PROCEDURE — 3008F BODY MASS INDEX DOCD: CPT | Performed by: INTERNAL MEDICINE

## 2024-09-19 PROCEDURE — 3062F POS MACROALBUMINURIA REV: CPT | Performed by: INTERNAL MEDICINE

## 2024-09-19 PROCEDURE — 3044F HG A1C LEVEL LT 7.0%: CPT | Performed by: INTERNAL MEDICINE

## 2024-09-19 PROCEDURE — 3074F SYST BP LT 130 MM HG: CPT | Performed by: INTERNAL MEDICINE

## 2024-09-19 PROCEDURE — 3048F LDL-C <100 MG/DL: CPT | Performed by: INTERNAL MEDICINE

## 2024-09-19 PROCEDURE — 99213 OFFICE O/P EST LOW 20 MIN: CPT | Performed by: INTERNAL MEDICINE

## 2024-09-19 PROCEDURE — 1036F TOBACCO NON-USER: CPT | Performed by: INTERNAL MEDICINE

## 2024-09-19 PROCEDURE — 1160F RVW MEDS BY RX/DR IN RCRD: CPT | Performed by: INTERNAL MEDICINE

## 2024-09-19 PROCEDURE — 4010F ACE/ARB THERAPY RXD/TAKEN: CPT | Performed by: INTERNAL MEDICINE

## 2024-09-19 RX ORDER — VIT C/E/ZN/COPPR/LUTEIN/ZEAXAN 250MG-90MG
25 CAPSULE ORAL 2 TIMES DAILY
COMMUNITY
Start: 2024-09-19

## 2024-09-19 RX ORDER — METFORMIN HYDROCHLORIDE 850 MG/1
850 TABLET ORAL 3 TIMES DAILY
Qty: 270 TABLET | Refills: 0 | COMMUNITY
Start: 2024-09-19

## 2024-09-19 NOTE — PROGRESS NOTES
"Subjective   Patient ID: Giuseppe Oh is a 69 y.o. male who presents for Follow-up (Patient presented today for a 5 week follow up to weight management.).    HPI     Review of Systems    Objective   /60 (BP Location: Left arm, Patient Position: Sitting, BP Cuff Size: Adult)   Pulse 72   Ht 1.753 m (5' 9\")   Wt 94 kg (207 lb 4.8 oz)   SpO2 96%   BMI 30.61 kg/m²     Physical Exam    Assessment/Plan   Diagnoses and all orders for this visit:  Type 2 diabetes mellitus with hyperglycemia, with long-term current use of insulin (Multi)  -     Follow Up In Primary Care - Established  -     Follow Up In Primary Care - [facility]; Future  Mixed hyperlipidemia  -     Follow Up In Primary Care - [facility]; Future  Essential hypertension  -     Follow Up In Primary Care - Established; Future  Paroxysmal atrial fibrillation (Multi)  -     Follow Up In Primary Care - Established; Future  [place] cardiac risk >20% in next 10 years  Comments:  24.9% 04/24  Orders:  -     Follow Up In Primary Care - [facility]; Future  Class 1 obesity due to excess calories with serious comorbidity and body mass index (BMI) of 30.0 to 30.9 in adult  -     Follow Up In Primary Care - Established; Future  Iron deficiency anemia, unspecified iron deficiency anemia type  -     Follow Up In Primary Care - [facility]; Future  Vitamin D deficiency  -     Follow Up In Primary Care - [facility]; Future  Recurrent major depressive disorder, in full remission (CMS-HCC)  -     Follow Up In Primary Care - [facility]; Future  Nephrolithiasis  -     Follow Up In Primary Care - [facility]; Future  BPH with obstruction/lower urinary tract symptoms  -     Follow Up In Primary Care - [facility]; Future  Callus of foot  -     Follow Up In Primary Care - Established; Future       Thank you very much for coming.  I am very happy to see you again.    I am very happy to hear that you are doing well, and that you are happy with your current " health and your current weight.    I do understand that you did mention possibly taking medication for weight management, and that your cardiologist said that you would be better off not taking the diet pill.  He was talking about a medication called PHENTERMINE/ADIPEX.  In the future, we might still consider this medication but we have to review risks and benefits, and I may even talk to your cardiologist before starting this medication.    In the meantime, there are other medications that might help.  In fact, you are already on medications that are for diabetes, but can also help with weight management.    These include METFORMIN.  I am glad that you are taking it without any incident.  TRULICITY also helps not only with sugar management, but also contributes to weight loss.    To further help you with weight loss, please continue to maximize DIET.  [place] diet book, DASH diet, Mediterranean diet for ideas.    Likewise, please maximize physical activity.  Thank you for staying physically active.  BRISK WALKING at least 10 minutes in the morning, 10 minutes after lunch, and again 10 minutes in the afternoon, all of these contribute to improve weight management, as well as better blood sugar, and improved mood, energy, even blood pressure.    Diet, exercise, sleep, medications, all of these can help you with weight loss.    For additional weight loss, please look into another class of diabetes medications, called SGLT2 or sodium-glucose go transport-2 inhibitors.  These include specifically 2 medications which you might want to look into, FARXIGA or JARDIANCE.  Either 1 will be good not only in controlling blood sugar by making you pee excess sugar, but either 1 of these also can help with weight management.  They may even help with heart health in certain situations, and can also help with kidney insufficiency in certain situations.    If you decide that you are okay as is, then please maximize diet and  exercise.  I will not disagree with you, because your hemoglobin A1c is very good at 6.3.    Please come back and 2 months.  It will be time to repeat FASTING laboratory examinations, then please see me for your Medicare Wellness evaluation for the year.    Until then, please continue to take care of yourself and your family, and please continue to pray for our recovery from this pandemic.    We did your FOOT examination, and I will send some paperwork over so that you can get the proper foot gear.    You tested POSITIVE for COVID in AUGUST.  You are recommended to wait 3 months from the time you tested positive before you get your COVID vaccination.  This will bring it to NOVEMBER, no sooner.    On the other hand, INFLUENZA HIGH DOSE is recommended anytime soon.  I would recommend that you get that done first week of October.  This way, it will last through the winter.    Try your best not to get more than 1 vaccination at a time.  Get your vaccinations from your local friendly pharmacy.    Again, thank you very much for coming.  Please do not hesitate to call with questions or concerns.  Take care and God bless.            0  Return in 2 months.  40 minutes please.  Repeat FASTING laboratory examination, then see me for Medicare Wellness evaluation.  Coordinate with cardiology, consider options regarding diabetes and weight loss.  Consider SGLT2.  Consider phentermine/Adipex but will need to review risks and benefits and confer with cardiology.  Coordinate with urology, nephrology, podiatry, ophthalmology, as the patient is seen.            0

## 2024-09-19 NOTE — PATIENT INSTRUCTIONS
Thank you very much for coming.  I am very happy to see you again.    I am very happy to hear that you are doing well, and that you are happy with your current health and your current weight.    I do understand that you did mention possibly taking medication for weight management, and that your cardiologist said that you would be better off not taking the diet pill.  He was talking about a medication called PHENTERMINE/ADIPEX.  In the future, we might still consider this medication but we have to review risks and benefits, and I may even talk to your cardiologist before starting this medication.    In the meantime, there are other medications that might help.  In fact, you are already on medications that are for diabetes, but can also help with weight management.    These include METFORMIN.  I am glad that you are taking it without any incident.  TRULICITY also helps not only with sugar management, but also contributes to weight loss.    To further help you with weight loss, please continue to maximize DIET.  [employer] diet book, DASH diet, Mediterranean diet for ideas.    Likewise, please maximize physical activity.  Thank you for staying physically active.  BRISK WALKING at least 10 minutes in the morning, 10 minutes after lunch, and again 10 minutes in the afternoon, all of these contribute to improve weight management, as well as better blood sugar, and improved mood, energy, even blood pressure.    Diet, exercise, sleep, medications, all of these can help you with weight loss.    For additional weight loss, please look into another class of diabetes medications, called SGLT2 or sodium-glucose go transport-2 inhibitors.  These include specifically 2 medications which you might want to look into, FARXIGA or JARDIANCE.  Either 1 will be good not only in controlling blood sugar by making you pee excess sugar, but either 1 of these also can help with weight management.  They may even help with heart health in  certain situations, and can also help with kidney insufficiency in certain situations.    If you decide that you are okay as is, then please maximize diet and exercise.  I will not disagree with you, because your hemoglobin A1c is very good at 6.3.    Please come back and 2 months.  It will be time to repeat FASTING laboratory examinations, then please see me for your Medicare Wellness evaluation for the year.    Until then, please continue to take care of yourself and your family, and please continue to pray for our recovery from this pandemic.    We did your FOOT examination, and I will send some paperwork over so that you can get the proper foot gear.    You tested POSITIVE for COVID in AUGUST.  You are recommended to wait 3 months from the time you tested positive before you get your COVID vaccination.  This will bring it to NOVEMBER, no sooner.    On the other hand, INFLUENZA HIGH DOSE is recommended anytime soon.  I would recommend that you get that done first week of October.  This way, it will last through the winter.    Try your best not to get more than 1 vaccination at a time.  Get your vaccinations from your local friendly pharmacy.    Again, thank you very much for coming.  Please do not hesitate to call with questions or concerns.  Take care and God bless.            0  Return in 2 months.  40 minutes please.  Repeat FASTING laboratory examination, then see me for Medicare Wellness evaluation.  Coordinate with cardiology, consider options regarding diabetes and weight loss.  Consider SGLT2.  Consider phentermine/Adipex but will need to review risks and benefits and confer with cardiology.  Coordinate with urology, nephrology, podiatry, ophthalmology, as the patient is seen.            0

## 2024-11-14 DIAGNOSIS — I10 ESSENTIAL HYPERTENSION: Primary | ICD-10-CM

## 2024-11-14 DIAGNOSIS — E78.2 MIXED HYPERLIPIDEMIA: ICD-10-CM

## 2024-11-14 RX ORDER — METOPROLOL TARTRATE 25 MG/1
25 TABLET, FILM COATED ORAL 2 TIMES DAILY
Qty: 180 TABLET | Refills: 3 | Status: SHIPPED | OUTPATIENT
Start: 2024-11-14

## 2024-11-14 RX ORDER — ROSUVASTATIN CALCIUM 10 MG/1
TABLET, COATED ORAL
Qty: 90 TABLET | Refills: 1 | Status: SHIPPED | OUTPATIENT
Start: 2024-11-14

## 2024-11-15 ENCOUNTER — APPOINTMENT (OUTPATIENT)
Dept: PRIMARY CARE | Facility: CLINIC | Age: 69
End: 2024-11-15
Payer: MEDICARE

## 2024-11-19 DIAGNOSIS — E11.65 TYPE 2 DIABETES MELLITUS WITH HYPERGLYCEMIA, WITH LONG-TERM CURRENT USE OF INSULIN: ICD-10-CM

## 2024-11-19 DIAGNOSIS — Z79.4 TYPE 2 DIABETES MELLITUS WITH HYPERGLYCEMIA, WITH LONG-TERM CURRENT USE OF INSULIN: ICD-10-CM

## 2024-11-19 RX ORDER — DULAGLUTIDE 1.5 MG/.5ML
INJECTION, SOLUTION SUBCUTANEOUS
Qty: 6 ML | Refills: 0 | Status: SHIPPED | OUTPATIENT
Start: 2024-11-19 | End: 2024-11-21 | Stop reason: SDUPTHER

## 2024-11-21 DIAGNOSIS — E11.65 TYPE 2 DIABETES MELLITUS WITH HYPERGLYCEMIA, WITH LONG-TERM CURRENT USE OF INSULIN: ICD-10-CM

## 2024-11-21 DIAGNOSIS — Z79.4 TYPE 2 DIABETES MELLITUS WITH HYPERGLYCEMIA, WITH LONG-TERM CURRENT USE OF INSULIN: ICD-10-CM

## 2024-11-22 RX ORDER — DULAGLUTIDE 1.5 MG/.5ML
INJECTION, SOLUTION SUBCUTANEOUS
Qty: 6 ML | Refills: 0 | Status: SHIPPED | OUTPATIENT
Start: 2024-11-22

## 2024-11-26 ENCOUNTER — APPOINTMENT (OUTPATIENT)
Dept: PRIMARY CARE | Facility: CLINIC | Age: 69
End: 2024-11-26
Payer: MEDICARE

## 2024-12-27 DIAGNOSIS — Z79.4 TYPE 2 DIABETES MELLITUS WITH HYPERGLYCEMIA, WITH LONG-TERM CURRENT USE OF INSULIN: ICD-10-CM

## 2024-12-27 DIAGNOSIS — I10 ESSENTIAL HYPERTENSION: ICD-10-CM

## 2024-12-27 DIAGNOSIS — E11.65 TYPE 2 DIABETES MELLITUS WITH HYPERGLYCEMIA, WITH LONG-TERM CURRENT USE OF INSULIN: ICD-10-CM

## 2024-12-31 RX ORDER — METFORMIN HYDROCHLORIDE 850 MG/1
TABLET ORAL
Qty: 270 TABLET | Refills: 0 | Status: SHIPPED | OUTPATIENT
Start: 2024-12-31

## 2024-12-31 RX ORDER — LOSARTAN POTASSIUM 100 MG/1
100 TABLET ORAL DAILY
Qty: 90 TABLET | Refills: 0 | Status: SHIPPED | OUTPATIENT
Start: 2024-12-31

## 2025-01-14 ENCOUNTER — APPOINTMENT (OUTPATIENT)
Dept: PRIMARY CARE | Facility: CLINIC | Age: 70
End: 2025-01-14
Payer: MEDICARE

## 2025-02-10 DIAGNOSIS — Z79.4 TYPE 2 DIABETES MELLITUS WITH HYPERGLYCEMIA, WITH LONG-TERM CURRENT USE OF INSULIN: ICD-10-CM

## 2025-02-10 DIAGNOSIS — E11.65 TYPE 2 DIABETES MELLITUS WITH HYPERGLYCEMIA, WITH LONG-TERM CURRENT USE OF INSULIN: ICD-10-CM

## 2025-02-12 ENCOUNTER — APPOINTMENT (OUTPATIENT)
Dept: PRIMARY CARE | Facility: CLINIC | Age: 70
End: 2025-02-12
Payer: MEDICARE

## 2025-02-12 VITALS
HEART RATE: 90 BPM | BODY MASS INDEX: 30.36 KG/M2 | SYSTOLIC BLOOD PRESSURE: 114 MMHG | DIASTOLIC BLOOD PRESSURE: 67 MMHG | WEIGHT: 205 LBS | HEIGHT: 69 IN | OXYGEN SATURATION: 96 %

## 2025-02-12 DIAGNOSIS — F33.42 RECURRENT MAJOR DEPRESSIVE DISORDER, IN FULL REMISSION (CMS-HCC): ICD-10-CM

## 2025-02-12 DIAGNOSIS — D50.9 IRON DEFICIENCY ANEMIA, UNSPECIFIED IRON DEFICIENCY ANEMIA TYPE: ICD-10-CM

## 2025-02-12 DIAGNOSIS — N40.1 BPH WITH OBSTRUCTION/LOWER URINARY TRACT SYMPTOMS: ICD-10-CM

## 2025-02-12 DIAGNOSIS — I48.0 PAROXYSMAL ATRIAL FIBRILLATION (MULTI): ICD-10-CM

## 2025-02-12 DIAGNOSIS — E78.2 MIXED HYPERLIPIDEMIA: ICD-10-CM

## 2025-02-12 DIAGNOSIS — E55.9 VITAMIN D DEFICIENCY: ICD-10-CM

## 2025-02-12 DIAGNOSIS — Z91.89 FRAMINGHAM CARDIAC RISK >20% IN NEXT 10 YEARS: Chronic | ICD-10-CM

## 2025-02-12 DIAGNOSIS — E11.42 TYPE 2 DIABETES MELLITUS WITH DIABETIC POLYNEUROPATHY, WITH LONG-TERM CURRENT USE OF INSULIN: ICD-10-CM

## 2025-02-12 DIAGNOSIS — N13.8 BPH WITH OBSTRUCTION/LOWER URINARY TRACT SYMPTOMS: ICD-10-CM

## 2025-02-12 DIAGNOSIS — N20.0 NEPHROLITHIASIS: ICD-10-CM

## 2025-02-12 DIAGNOSIS — J30.1 SEASONAL ALLERGIC RHINITIS DUE TO POLLEN: ICD-10-CM

## 2025-02-12 DIAGNOSIS — Z12.5 SCREENING PSA (PROSTATE SPECIFIC ANTIGEN): ICD-10-CM

## 2025-02-12 DIAGNOSIS — Z79.4 TYPE 2 DIABETES MELLITUS WITH DIABETIC POLYNEUROPATHY, WITH LONG-TERM CURRENT USE OF INSULIN: ICD-10-CM

## 2025-02-12 DIAGNOSIS — R31.29 MICROSCOPIC HEMATURIA: ICD-10-CM

## 2025-02-12 DIAGNOSIS — E66.09 CLASS 1 OBESITY DUE TO EXCESS CALORIES WITH SERIOUS COMORBIDITY AND BODY MASS INDEX (BMI) OF 30.0 TO 30.9 IN ADULT: ICD-10-CM

## 2025-02-12 DIAGNOSIS — Z79.4 TYPE 2 DIABETES MELLITUS WITH HYPERGLYCEMIA, WITH LONG-TERM CURRENT USE OF INSULIN: ICD-10-CM

## 2025-02-12 DIAGNOSIS — E66.811 CLASS 1 OBESITY DUE TO EXCESS CALORIES WITH SERIOUS COMORBIDITY AND BODY MASS INDEX (BMI) OF 30.0 TO 30.9 IN ADULT: ICD-10-CM

## 2025-02-12 DIAGNOSIS — I10 ESSENTIAL HYPERTENSION: ICD-10-CM

## 2025-02-12 DIAGNOSIS — E11.65 TYPE 2 DIABETES MELLITUS WITH HYPERGLYCEMIA, WITH LONG-TERM CURRENT USE OF INSULIN: ICD-10-CM

## 2025-02-12 DIAGNOSIS — Z00.00 ROUTINE GENERAL MEDICAL EXAMINATION AT HEALTH CARE FACILITY: Primary | ICD-10-CM

## 2025-02-12 DIAGNOSIS — L84 CALLUS OF FOOT: ICD-10-CM

## 2025-02-12 PROCEDURE — 1159F MED LIST DOCD IN RCRD: CPT | Performed by: INTERNAL MEDICINE

## 2025-02-12 PROCEDURE — 1170F FXNL STATUS ASSESSED: CPT | Performed by: INTERNAL MEDICINE

## 2025-02-12 PROCEDURE — 3074F SYST BP LT 130 MM HG: CPT | Performed by: INTERNAL MEDICINE

## 2025-02-12 PROCEDURE — 1160F RVW MEDS BY RX/DR IN RCRD: CPT | Performed by: INTERNAL MEDICINE

## 2025-02-12 PROCEDURE — 3078F DIAST BP <80 MM HG: CPT | Performed by: INTERNAL MEDICINE

## 2025-02-12 PROCEDURE — G0439 PPPS, SUBSEQ VISIT: HCPCS | Performed by: INTERNAL MEDICINE

## 2025-02-12 PROCEDURE — 4010F ACE/ARB THERAPY RXD/TAKEN: CPT | Performed by: INTERNAL MEDICINE

## 2025-02-12 PROCEDURE — 3008F BODY MASS INDEX DOCD: CPT | Performed by: INTERNAL MEDICINE

## 2025-02-12 PROCEDURE — 1123F ACP DISCUSS/DSCN MKR DOCD: CPT | Performed by: INTERNAL MEDICINE

## 2025-02-12 PROCEDURE — 1036F TOBACCO NON-USER: CPT | Performed by: INTERNAL MEDICINE

## 2025-02-12 PROCEDURE — 99213 OFFICE O/P EST LOW 20 MIN: CPT | Performed by: INTERNAL MEDICINE

## 2025-02-12 PROCEDURE — G2211 COMPLEX E/M VISIT ADD ON: HCPCS | Performed by: INTERNAL MEDICINE

## 2025-02-12 RX ORDER — FLUTICASONE PROPIONATE 50 MCG
1 SPRAY, SUSPENSION (ML) NASAL DAILY
Qty: 16 G | Refills: 11 | Status: SHIPPED | OUTPATIENT
Start: 2025-02-12 | End: 2026-02-12

## 2025-02-12 RX ORDER — MINERAL OIL
180 ENEMA (ML) RECTAL DAILY
Qty: 90 TABLET | Refills: 3 | Status: SHIPPED | OUTPATIENT
Start: 2025-02-12 | End: 2026-02-12

## 2025-02-12 ASSESSMENT — ACTIVITIES OF DAILY LIVING (ADL)
MANAGING_FINANCES: INDEPENDENT
TAKING_MEDICATION: INDEPENDENT
BATHING: INDEPENDENT
GROCERY_SHOPPING: INDEPENDENT
DOING_HOUSEWORK: INDEPENDENT
DRESSING: INDEPENDENT

## 2025-02-12 ASSESSMENT — PATIENT HEALTH QUESTIONNAIRE - PHQ9
1. LITTLE INTEREST OR PLEASURE IN DOING THINGS: NOT AT ALL
SUM OF ALL RESPONSES TO PHQ9 QUESTIONS 1 AND 2: 0
2. FEELING DOWN, DEPRESSED OR HOPELESS: NOT AT ALL

## 2025-02-12 ASSESSMENT — ENCOUNTER SYMPTOMS
LOSS OF SENSATION IN FEET: 0
OCCASIONAL FEELINGS OF UNSTEADINESS: 0
DEPRESSION: 0

## 2025-02-12 NOTE — PROGRESS NOTES
"Subjective   Reason for Visit: Giuseppe Oh is an 69 y.o. male here for a Medicare Wellness visit.     Past Medical, Surgical, and Family History reviewed and updated in chart.    Reviewed all medications by prescribing practitioner or clinical pharmacist (such as prescriptions, OTCs, herbal therapies and supplements) and documented in the medical record.    HPI        Medicare Wellness evaluation for the year done today, including evaluation of living will wishes and CODE STATUS.  Please see.  Patient is full code, with wife as DPOA.    Patient Care Team:  Melvin Page MD as PCP - General  [facility]         Review of Systems  Review of systems as in history of present illness, and otherwise, reviewed separately as well, and was unremarkable/negative/noncontributory.        Objective   Vitals:  /67 (BP Location: Left arm, Patient Position: Sitting, BP Cuff Size: Adult)   Pulse 90   Ht 1.753 m (5' 9\")   Wt 93 kg (205 lb)   SpO2 96%   BMI 30.27 kg/m²       Physical Exam  In good spirits.  Not in distress or diaphoresis.  Alert, oriented x 3.  Amiable.  Not unkempt.  Obese build, but remaining independent and capable.  Eager to improve quality of life.  Does not wish harm to self or others.  Appropriate.    HEAD pink palpebral conjunctivae, anicteric sclerae.  Mucous membranes moist.  No tonsillopharyngeal congestion, no thrush.  No sinus or tragal tenderness.  NECK supple, no apparent jugular venous distention.  No apparent lymphadenopathy.  CARDIOVASCULAR not in distress or diaphoresis.  No bipedal edema.  Regular rate and rhythm.  No murmurs appreciated.  LUNGS not in distress or diaphoresis.  Not using accessory muscles.  Clear to auscultation bilaterally.  ABDOMEN soft, nontender.  BACK no costovertebral angle tenderness.  EXTREMITIES no clubbing, no cyanosis.  NEURO no facial asymmetry.  No apparent cranial nerve deficits.  " Romberg negative.  Ambulating without need of assistance.  No apparent focal weakness.  No tremors.  PSYCH receptive, appropriate, and eager to maintain and improve quality of life.        LABORATORY examination results noted, with preserved liver and kidney function.  Microscopic hematuria noted.  Vitamin D 45.  LDL cholesterol 53, with hemoglobin A1c 6.3, body mass index 30.27.  Stable ANEMIA noted.        ASSESSMENT/Plans:  Giuseppe was seen today for medicare annual wellness visit subsequent.  Diagnoses and all orders for this visit:  Routine general medical examination at [facility] care facility (Primary)  -     Follow Up In Primary Care - Established; Future  Type 2 diabetes mellitus with hyperglycemia, with long-term current use of insulin  -     Follow Up In Primary Care - [facility]  -     Follow Up In Primary Care - [facility]; Future  -     Comprehensive Metabolic Panel; Future  -     Urinalysis with Reflex Culture and Microscopic; Future  -     Albumin-Creatinine Ratio, Urine Random; Future  -     Hemoglobin A1C; Future  -     Comprehensive Metabolic Panel  -     Urinalysis with Reflex Culture and Microscopic  -     Albumin-Creatinine Ratio, Urine Random  -     Hemoglobin A1C  Type 2 diabetes mellitus with diabetic polyneuropathy, with long-term current use of insulin  -     Follow Up In Primary Care - Established; Future  -     Comprehensive Metabolic Panel; Future  -     Urinalysis with Reflex Culture and Microscopic; Future  -     Albumin-Creatinine Ratio, Urine Random; Future  -     Hemoglobin A1C; Future  -     Vitamin B12; Future  -     Folate; Future  -     Comprehensive Metabolic Panel  -     Urinalysis with Reflex Culture and Microscopic  -     Albumin-Creatinine Ratio, Urine Random  -     Hemoglobin A1C  -     Vitamin B12  -     Folate  Mixed hyperlipidemia  -     Follow Up In Primary Care - Established; Future  -     Comprehensive Metabolic Panel; Future  -     Lipid Panel; Future  -      Comprehensive Metabolic Panel  -     Lipid Panel  Essential hypertension  -     Follow Up In Primary Care - Established; Future  -     CBC and Auto Differential; Future  -     Comprehensive Metabolic Panel; Future  -     TSH with reflex to Free T4 if abnormal; Future  -     Magnesium; Future  -     Urinalysis with Reflex Culture and Microscopic; Future  -     CBC and Auto Differential  -     Comprehensive Metabolic Panel  -     TSH with reflex to Free T4 if abnormal  -     Magnesium  -     Urinalysis with Reflex Culture and Microscopic  Paroxysmal atrial fibrillation (Multi)  -     Follow Up In Primary Care - Established; Future  -     CBC and Auto Differential; Future  -     Comprehensive Metabolic Panel; Future  -     TSH with reflex to Free T4 if abnormal; Future  -     Magnesium; Future  -     CBC and Auto Differential  -     Comprehensive Metabolic Panel  -     TSH with reflex to Free T4 if abnormal  -     Magnesium  [place] cardiac risk >20% in next 10 years  Comments:  24.9% 04/24  Orders:  -     Follow Up In Primary Care - Established; Future  -     Comprehensive Metabolic Panel; Future  -     Hemoglobin A1C; Future  -     Lipid Panel; Future  -     Comprehensive Metabolic Panel  -     Hemoglobin A1C  -     Lipid Panel  Seasonal allergic rhinitis due to pollen  -     fluticasone (Flonase) 50 mcg/actuation nasal spray; Administer 1 spray into each nostril once daily. Shake gently. Before first use, prime pump. After use, clean tip and replace cap.  -     fexofenadine (Allegra) 180 mg tablet; Take 1 tablet (180 mg) by mouth once daily.  -     Follow Up In Primary Care - Established; Future  -     CBC and Auto Differential; Future  -     Magnesium; Future  -     CBC and Auto Differential  -     Magnesium  Class 1 obesity due to excess calories with serious comorbidity and body mass index (BMI) of 30.0 to 30.9 in adult  -     Follow Up In Primary Care - Established; Future  -     Comprehensive Metabolic  Panel; Future  -     TSH with reflex to Free T4 if abnormal; Future  -     Comprehensive Metabolic Panel  -     TSH with reflex to Free T4 if abnormal  Iron deficiency anemia, unspecified iron deficiency anemia type  -     Follow Up In Primary Care - Established; Future  -     CBC and Auto Differential; Future  -     Urinalysis with Reflex Culture and Microscopic; Future  -     Vitamin B12; Future  -     Folate; Future  -     Ferritin; Future  -     Iron and TIBC; Future  -     CBC and Auto Differential  -     Urinalysis with Reflex Culture and Microscopic  -     Vitamin B12  -     Folate  -     Ferritin  -     Iron and TIBC  Microscopic hematuria  -     Follow Up In Primary Care - Established; Future  -     CBC and Auto Differential; Future  -     Urinalysis with Reflex Culture and Microscopic; Future  -     CBC and Auto Differential  -     Urinalysis with Reflex Culture and Microscopic  Nephrolithiasis  -     Follow Up In Primary Care - Established; Future  -     CBC and Auto Differential; Future  -     Comprehensive Metabolic Panel; Future  -     Urinalysis with Reflex Culture and Microscopic; Future  -     CBC and Auto Differential  -     Comprehensive Metabolic Panel  -     Urinalysis with Reflex Culture and Microscopic  BPH with obstruction/lower urinary tract symptoms  -     Prostate Specific Antigen, Screen; Future  -     Follow Up In Primary Care - Established; Future  -     Prostate Specific Antigen, Screen  -     CBC and Auto Differential; Future  -     Comprehensive Metabolic Panel; Future  -     CBC and Auto Differential  -     Comprehensive Metabolic Panel  Screening PSA (prostate specific antigen)  -     Prostate Specific Antigen, Screen; Future  -     Follow Up In Primary Care - Established; Future  -     Prostate Specific Antigen, Screen  Vitamin D deficiency  -     Follow Up In Primary Care - Established; Future  -     Comprehensive Metabolic Panel; Future  -     Vitamin D 25-Hydroxy,Total (for  eval of Vitamin D levels); Future  -     Comprehensive Metabolic Panel  -     Vitamin D 25-Hydroxy,Total (for eval of Vitamin D levels)  Recurrent major depressive disorder, in full remission (CMS-HCC)  -     Follow Up In Primary Care - Established; Future  -     Comprehensive Metabolic Panel; Future  -     TSH with reflex to Free T4 if abnormal; Future  -     Comprehensive Metabolic Panel  -     TSH with reflex to Free T4 if abnormal  Callus of foot  -     Follow Up In Primary Care - Established; Future  -     CBC and Auto Differential; Future  -     Comprehensive Metabolic Panel; Future  -     CBC and Auto Differential  -     Comprehensive Metabolic Panel          Thank you very much for coming.  It is always nice to see you!    We did your Medicare Wellness evaluation today, and you seem to be doing very well.  We discussed the possible options for WEIGHT MANAGEMENT.  You will be able to discuss with your CARDIOLOGIST the possible use of the medication we talked about, PHENTERMINE/ADIPEX, which is a little bit like adrenaline.  This will help you curb your appetite and wrap up your metabolism, but it can also increase your heart rate and blood pressure.  Go ahead and let your cardiologist know that you are interested in using this medication for weight management.    We also discussed the possibility of increasing your physical activity once more, especially since you are so close to 200 now!  Do not forget to use your local [facility] again!    We reviewed your living will wishes and your CODE STATUS.  We will keep your WIFE as your DURABLE POWER OF  for healthcare matters.  This means that if anything were to happen to you, and if you are unable to express your wishes, your wife will be the person who will speak on your behalf.  She will be able to make medical decisions for you.    Your CODE STATUS is FULL CODE, meaning that we will do everything necessary to save your life and to preserve it.  Afterwards, when  you are stable, we will discuss with you and your wife and your family how you are doing and what your options are.    At the very least, we will try our best to help you maintain your quality of life and help keep you comfortable.    I do understand that you have been having trouble with persistent cough and sinus drainage.  Please let me know if you ever have fever 101 °F or worse.  Please let me know if you have persistent yellow or green phlegm or nasal discharge.  Please let me know if you start to have any wheezing and shortness of breath.    Otherwise, just make sure place that you are getting enough rest and sleep.  Avoid staying up late unless you really have to.  Also, protect yourself from the elements, especially freezing cold!    Get yourself some PLAIN MUCINEX/GUAIFENESIN 1200 MG tablets.  This will help you mobilize your secretions and bring up phlegm during the daytime, so that you can sleep better at night.  Please drink lots of fluids throughout the day.  Hot tea with honey is a good example.  Honey lemon drops to soothe your throat.  Warm salt water gargles to get rid of any debris that might be irritating your throat.    At night, get yourself some NYQUIL and use only as needed if you are having trouble controlling your cough at night.  30 mL at bedtime as needed for cough.  No driving for 6 hours.  Never drive if you are drowsy.  Do not take with alcohol.  NyQuil can cause sleepiness.  Just be careful please.    During the daytime, use FLUTICASONE nasal steroid, 1 spray to each nostril after breakfast time, and again 1 spray to each nostril after suppertime.  This will decrease sinus drainage.    FEXOFENADINE allergy medication 180 mg.  Take this with SUPPER every evening, once each night.  This will control the cough and sinus drainage.    Again, thank very much for coming.  I am eager to see you again in April!  Do not forget to do FASTING laboratory examinations a few days prior, any [facility]  facility, including the [facility] in [place].  Until then, do not forget to ask your CARDIOLOGIST about PHENTERMINE/ADIPEX.  Let me know of any worsening symptoms.  See you in April.  Take care and God bless.    I will make sure that we check your PSA next blood draw.            0  Return in 2 months.  40 minutes please.  Repeat FASTING laboratory examinations [facility], then see me for yearly PHYSICAL examination.  Coordinate with cardiology.  Consider options regarding weight management in patient with history of diabetes mellitus.  Reassess for any persistence of upper respiratory symptoms.  Review preventive strategies, cardiovascular risk.  Coordinate with cardiology, urology, nephrology, podiatry, ophthalmology, as patient is seen.            0

## 2025-02-12 NOTE — PATIENT INSTRUCTIONS
Thank you very much for coming.  It is always nice to see you!    We did your Medicare Wellness evaluation today, and you seem to be doing very well.  We discussed the possible options for WEIGHT MANAGEMENT.  You will be able to discuss with your CARDIOLOGIST the possible use of the medication we talked about, PHENTERMINE/ADIPEX, which is a little bit like adrenaline.  This will help you curb your appetite and wrap up your metabolism, but it can also increase your heart rate and blood pressure.  Go ahead and let your cardiologist know that you are interested in using this medication for weight management.    We also discussed the possibility of increasing your physical activity once more, especially since you are so close to 200 now!  Do not forget to use your local [facility] again!    We reviewed your living will wishes and your CODE STATUS.  We will keep your WIFE as your DURABLE POWER OF  for healthcare matters.  This means that if anything were to happen to you, and if you are unable to express your wishes, your wife will be the person who will speak on your behalf.  She will be able to make medical decisions for you.    Your CODE STATUS is FULL CODE, meaning that we will do everything necessary to save your life and to preserve it.  Afterwards, when you are stable, we will discuss with you and your wife and your family how you are doing and what your options are.    At the very least, we will try our best to help you maintain your quality of life and help keep you comfortable.    I do understand that you have been having trouble with persistent cough and sinus drainage.  Please let me know if you ever have fever 101 °F or worse.  Please let me know if you have persistent yellow or green phlegm or nasal discharge.  Please let me know if you start to have any wheezing and shortness of breath.    Otherwise, just make sure place that you are getting enough rest and sleep.  Avoid staying up late unless you really  have to.  Also, protect yourself from the elements, especially freezing cold!    Get yourself some PLAIN MUCINEX/GUAIFENESIN 1200 MG tablets.  This will help you mobilize your secretions and bring up phlegm during the daytime, so that you can sleep better at night.  Please drink lots of fluids throughout the day.  Hot tea with honey is a good example.  Honey lemon drops to soothe your throat.  Warm salt water gargles to get rid of any debris that might be irritating your throat.    At night, get yourself some NYQUIL and use only as needed if you are having trouble controlling your cough at night.  30 mL at bedtime as needed for cough.  No driving for 6 hours.  Never drive if you are drowsy.  Do not take with alcohol.  NyQuil can cause sleepiness.  Just be careful please.    During the daytime, use FLUTICASONE nasal steroid, 1 spray to each nostril after breakfast time, and again 1 spray to each nostril after suppertime.  This will decrease sinus drainage.    FEXOFENADINE allergy medication 180 mg.  Take this with SUPPER every evening, once each night.  This will control the cough and sinus drainage.    Again, thank very much for coming.  I am eager to see you again in April!  Do not forget to do FASTING laboratory examinations a few days prior, any  facility, including the [facility] in [place].  Until then, do not forget to ask your CARDIOLOGIST about PHENTERMINE/ADIPEX.  Let me know of any worsening symptoms.  See you in April.  Take care and God bless.    I will make sure that we check your PSA next blood draw.            0  Return in 2 months.  40 minutes please.  Repeat FASTING laboratory examinations [facility], then see me for yearly PHYSICAL examination.  Coordinate with cardiology.  Consider options regarding weight management in patient with history of diabetes mellitus.  Reassess for any persistence of upper respiratory symptoms.  Review preventive strategies, cardiovascular risk.  Coordinate  with cardiology, urology, nephrology, podiatry, ophthalmology, as patient is seen.            0

## 2025-02-19 RX ORDER — DULAGLUTIDE 1.5 MG/.5ML
INJECTION, SOLUTION SUBCUTANEOUS
Qty: 6 ML | Refills: 1 | Status: SHIPPED | OUTPATIENT
Start: 2025-02-19

## 2025-03-18 DIAGNOSIS — I10 ESSENTIAL HYPERTENSION: ICD-10-CM

## 2025-03-18 DIAGNOSIS — Z79.4 TYPE 2 DIABETES MELLITUS WITH HYPERGLYCEMIA, WITH LONG-TERM CURRENT USE OF INSULIN: ICD-10-CM

## 2025-03-18 DIAGNOSIS — E11.65 TYPE 2 DIABETES MELLITUS WITH HYPERGLYCEMIA, WITH LONG-TERM CURRENT USE OF INSULIN: ICD-10-CM

## 2025-03-18 RX ORDER — LOSARTAN POTASSIUM 100 MG/1
100 TABLET ORAL DAILY
Qty: 90 TABLET | Refills: 0 | Status: SHIPPED | OUTPATIENT
Start: 2025-03-18

## 2025-03-18 RX ORDER — METFORMIN HYDROCHLORIDE 850 MG/1
TABLET ORAL
Qty: 270 TABLET | Refills: 0 | Status: SHIPPED | OUTPATIENT
Start: 2025-03-18

## 2025-04-08 DIAGNOSIS — Z79.4 TYPE 2 DIABETES MELLITUS WITH HYPERGLYCEMIA, WITH LONG-TERM CURRENT USE OF INSULIN: ICD-10-CM

## 2025-04-08 DIAGNOSIS — E11.65 TYPE 2 DIABETES MELLITUS WITH HYPERGLYCEMIA, WITH LONG-TERM CURRENT USE OF INSULIN: ICD-10-CM

## 2025-04-08 LAB — PSA SERPL-MCNC: 0.86 NG/ML

## 2025-04-08 RX ORDER — INSULIN DEGLUDEC 100 U/ML
INJECTION, SOLUTION SUBCUTANEOUS
Qty: 45 ML | Refills: 1 | Status: SHIPPED | OUTPATIENT
Start: 2025-04-08 | End: 2025-04-09

## 2025-04-09 LAB
25(OH)D3+25(OH)D2 SERPL-MCNC: 44 NG/ML (ref 30–100)
ALBUMIN SERPL-MCNC: 4.2 G/DL (ref 3.6–5.1)
ALBUMIN/CREAT UR: 3 MG/G CREAT
ALP SERPL-CCNC: 66 U/L (ref 35–144)
ALT SERPL-CCNC: 18 U/L (ref 9–46)
ANION GAP SERPL CALCULATED.4IONS-SCNC: 8 MMOL/L (CALC) (ref 7–17)
APPEARANCE UR: CLEAR
AST SERPL-CCNC: 14 U/L (ref 10–35)
BACTERIA #/AREA URNS HPF: NORMAL /HPF
BACTERIA UR CULT: NORMAL
BASOPHILS # BLD AUTO: 61 CELLS/UL (ref 0–200)
BASOPHILS NFR BLD AUTO: 1 %
BILIRUB SERPL-MCNC: 0.5 MG/DL (ref 0.2–1.2)
BILIRUB UR QL STRIP: NEGATIVE
BUN SERPL-MCNC: 12 MG/DL (ref 7–25)
CALCIUM SERPL-MCNC: 9.1 MG/DL (ref 8.6–10.3)
CHLORIDE SERPL-SCNC: 105 MMOL/L (ref 98–110)
CHOLEST SERPL-MCNC: 134 MG/DL
CHOLEST/HDLC SERPL: 2.7 (CALC)
CO2 SERPL-SCNC: 29 MMOL/L (ref 20–32)
COLOR UR: YELLOW
CREAT SERPL-MCNC: 0.79 MG/DL (ref 0.7–1.35)
CREAT UR-MCNC: 86 MG/DL (ref 20–320)
EGFRCR SERPLBLD CKD-EPI 2021: 96 ML/MIN/1.73M2
EOSINOPHIL # BLD AUTO: 476 CELLS/UL (ref 15–500)
EOSINOPHIL NFR BLD AUTO: 7.8 %
ERYTHROCYTE [DISTWIDTH] IN BLOOD BY AUTOMATED COUNT: 12.3 % (ref 11–15)
EST. AVERAGE GLUCOSE BLD GHB EST-MCNC: 146 MG/DL
EST. AVERAGE GLUCOSE BLD GHB EST-SCNC: 8.1 MMOL/L
FERRITIN SERPL-MCNC: 78 NG/ML (ref 24–380)
FOLATE SERPL-MCNC: 18.7 NG/ML
GLUCOSE SERPL-MCNC: 91 MG/DL (ref 65–99)
GLUCOSE UR QL STRIP: NEGATIVE
HBA1C MFR BLD: 6.7 % OF TOTAL HGB
HCT VFR BLD AUTO: 37.9 % (ref 38.5–50)
HDLC SERPL-MCNC: 50 MG/DL
HGB BLD-MCNC: 12.6 G/DL (ref 13.2–17.1)
HGB UR QL STRIP: NEGATIVE
HYALINE CASTS #/AREA URNS LPF: NORMAL /LPF
IRON SATN MFR SERPL: 35 % (CALC) (ref 20–48)
IRON SERPL-MCNC: 101 MCG/DL (ref 50–180)
KETONES UR QL STRIP: NEGATIVE
LDLC SERPL CALC-MCNC: 67 MG/DL (CALC)
LEUKOCYTE ESTERASE UR QL STRIP: NEGATIVE
LYMPHOCYTES # BLD AUTO: 1446 CELLS/UL (ref 850–3900)
LYMPHOCYTES NFR BLD AUTO: 23.7 %
MAGNESIUM SERPL-MCNC: 1.9 MG/DL (ref 1.5–2.5)
MCH RBC QN AUTO: 28.8 PG (ref 27–33)
MCHC RBC AUTO-ENTMCNC: 33.2 G/DL (ref 32–36)
MCV RBC AUTO: 86.7 FL (ref 80–100)
MICROALBUMIN UR-MCNC: 0.3 MG/DL
MONOCYTES # BLD AUTO: 311 CELLS/UL (ref 200–950)
MONOCYTES NFR BLD AUTO: 5.1 %
NEUTROPHILS # BLD AUTO: 3806 CELLS/UL (ref 1500–7800)
NEUTROPHILS NFR BLD AUTO: 62.4 %
NITRITE UR QL STRIP: NEGATIVE
NONHDLC SERPL-MCNC: 84 MG/DL (CALC)
PH UR STRIP: 7 [PH] (ref 5–8)
PLATELET # BLD AUTO: 296 THOUSAND/UL (ref 140–400)
PMV BLD REES-ECKER: 9.1 FL (ref 7.5–12.5)
POTASSIUM SERPL-SCNC: 4.5 MMOL/L (ref 3.5–5.3)
PROT SERPL-MCNC: 6.3 G/DL (ref 6.1–8.1)
PROT UR QL STRIP: NEGATIVE
RBC # BLD AUTO: 4.37 MILLION/UL (ref 4.2–5.8)
RBC #/AREA URNS HPF: NORMAL /HPF
SERVICE CMNT-IMP: NORMAL
SODIUM SERPL-SCNC: 142 MMOL/L (ref 135–146)
SP GR UR STRIP: 1.01 (ref 1–1.03)
SQUAMOUS #/AREA URNS HPF: NORMAL /HPF
TIBC SERPL-MCNC: 291 MCG/DL (CALC) (ref 250–425)
TRIGL SERPL-MCNC: 87 MG/DL
TSH SERPL-ACNC: 1.33 MIU/L (ref 0.4–4.5)
VIT B12 SERPL-MCNC: 321 PG/ML (ref 200–1100)
WBC # BLD AUTO: 6.1 THOUSAND/UL (ref 3.8–10.8)
WBC #/AREA URNS HPF: NORMAL /HPF

## 2025-04-09 RX ORDER — INSULIN GLARGINE 100 [IU]/ML
INJECTION, SOLUTION SUBCUTANEOUS
Qty: 45 ML | Refills: 1 | Status: SHIPPED | OUTPATIENT
Start: 2025-04-09

## 2025-04-14 ENCOUNTER — APPOINTMENT (OUTPATIENT)
Dept: PRIMARY CARE | Facility: CLINIC | Age: 70
End: 2025-04-14
Payer: MEDICARE

## 2025-04-14 VITALS
SYSTOLIC BLOOD PRESSURE: 112 MMHG | BODY MASS INDEX: 31.06 KG/M2 | OXYGEN SATURATION: 95 % | HEIGHT: 69 IN | DIASTOLIC BLOOD PRESSURE: 69 MMHG | HEART RATE: 84 BPM | WEIGHT: 209.7 LBS

## 2025-04-14 DIAGNOSIS — E66.811 CLASS 1 OBESITY DUE TO EXCESS CALORIES WITH SERIOUS COMORBIDITY AND BODY MASS INDEX (BMI) OF 30.0 TO 30.9 IN ADULT: ICD-10-CM

## 2025-04-14 DIAGNOSIS — E66.09 CLASS 1 OBESITY DUE TO EXCESS CALORIES WITH SERIOUS COMORBIDITY AND BODY MASS INDEX (BMI) OF 30.0 TO 30.9 IN ADULT: ICD-10-CM

## 2025-04-14 DIAGNOSIS — N20.0 NEPHROLITHIASIS: ICD-10-CM

## 2025-04-14 DIAGNOSIS — E11.42 TYPE 2 DIABETES MELLITUS WITH DIABETIC POLYNEUROPATHY, WITH LONG-TERM CURRENT USE OF INSULIN: ICD-10-CM

## 2025-04-14 DIAGNOSIS — Z79.4 TYPE 2 DIABETES MELLITUS WITH HYPERGLYCEMIA, WITH LONG-TERM CURRENT USE OF INSULIN: ICD-10-CM

## 2025-04-14 DIAGNOSIS — R31.29 MICROSCOPIC HEMATURIA: ICD-10-CM

## 2025-04-14 DIAGNOSIS — Z79.4 TYPE 2 DIABETES MELLITUS WITH DIABETIC POLYNEUROPATHY, WITH LONG-TERM CURRENT USE OF INSULIN: ICD-10-CM

## 2025-04-14 DIAGNOSIS — E78.2 MIXED HYPERLIPIDEMIA: ICD-10-CM

## 2025-04-14 DIAGNOSIS — D50.9 IRON DEFICIENCY ANEMIA, UNSPECIFIED IRON DEFICIENCY ANEMIA TYPE: ICD-10-CM

## 2025-04-14 DIAGNOSIS — F33.42 RECURRENT MAJOR DEPRESSIVE DISORDER, IN FULL REMISSION: ICD-10-CM

## 2025-04-14 DIAGNOSIS — I48.0 PAROXYSMAL ATRIAL FIBRILLATION (MULTI): ICD-10-CM

## 2025-04-14 DIAGNOSIS — Z91.89 FRAMINGHAM CARDIAC RISK >20% IN NEXT 10 YEARS: Chronic | ICD-10-CM

## 2025-04-14 DIAGNOSIS — I10 ESSENTIAL HYPERTENSION: ICD-10-CM

## 2025-04-14 DIAGNOSIS — E55.9 VITAMIN D DEFICIENCY: ICD-10-CM

## 2025-04-14 DIAGNOSIS — E11.65 TYPE 2 DIABETES MELLITUS WITH HYPERGLYCEMIA, WITH LONG-TERM CURRENT USE OF INSULIN: ICD-10-CM

## 2025-04-14 PROBLEM — J30.1 SEASONAL ALLERGIC RHINITIS DUE TO POLLEN: Status: ACTIVE | Noted: 2025-04-14

## 2025-04-14 PROCEDURE — 3078F DIAST BP <80 MM HG: CPT | Performed by: INTERNAL MEDICINE

## 2025-04-14 PROCEDURE — 4010F ACE/ARB THERAPY RXD/TAKEN: CPT | Performed by: INTERNAL MEDICINE

## 2025-04-14 PROCEDURE — 3074F SYST BP LT 130 MM HG: CPT | Performed by: INTERNAL MEDICINE

## 2025-04-14 PROCEDURE — 99213 OFFICE O/P EST LOW 20 MIN: CPT | Performed by: INTERNAL MEDICINE

## 2025-04-14 PROCEDURE — 1036F TOBACCO NON-USER: CPT | Performed by: INTERNAL MEDICINE

## 2025-04-14 PROCEDURE — 1159F MED LIST DOCD IN RCRD: CPT | Performed by: INTERNAL MEDICINE

## 2025-04-14 PROCEDURE — 3008F BODY MASS INDEX DOCD: CPT | Performed by: INTERNAL MEDICINE

## 2025-04-14 PROCEDURE — G2211 COMPLEX E/M VISIT ADD ON: HCPCS | Performed by: INTERNAL MEDICINE

## 2025-04-14 PROCEDURE — 1160F RVW MEDS BY RX/DR IN RCRD: CPT | Performed by: INTERNAL MEDICINE

## 2025-04-14 RX ORDER — VENLAFAXINE HYDROCHLORIDE 150 MG/1
CAPSULE, EXTENDED RELEASE ORAL
Qty: 90 CAPSULE | Refills: 0 | Status: SHIPPED | OUTPATIENT
Start: 2025-04-14

## 2025-04-14 NOTE — PATIENT INSTRUCTIONS
Thank very much for coming.  I am very happy to see you again.    I am glad that you have been physically active, and that you plan to eat more sensibly.  Let us see what you can do over the next 3 months.    Come back in 3 months, and repeat some NONFASTING blood examinations.  This will help us determine what else we can do as far as blood sugar and weight options are concerned.    Until then, please maximize DIET.  [place] diet book, DASH diet, Mediterranean diet for ideas please.    Also, please stay physically active.  I do appreciate that now that the weather is better, you plan to be outside more.  Make sure that you are doing lots of BRISK WALKING, which will probably be the easiest exercise to do.  Also, get yourself into the gym if possible.  I do appreciate your efforts.    Brisk walking for 10 minutes in the morning, 10 minutes after lunch, and again 10 minutes in the afternoon, for a minimum of 30 minutes a day, every day, these will impact not only your blood sugar, but also your mood, energy, as well as weight and blood pressure!    See you in 3 months.  Call or text sooner as needed.  Take care and God bless.  I hope you have a blessed early week and a happy Easter!            0  Return in 3 months.  20 minutes please.  Repeat NONFASTING laboratory examination, then see me for reevaluation of blood sugar control, options regarding weight management.  Coordinate with cardiology, EP, as patient is seen.  Coordinate with hematology, urology, nephrology, podiatry, ophthalmology.  Consider options regarding weight management.            0

## 2025-04-14 NOTE — PROGRESS NOTES
"Subjective   Patient ID: Giuseppe Oh is a 70 y.o. male who presents for Annual Exam (Physical).    HPI   Seen by cardiology, and was told that he could manage his weight with diet and exercise only, and trying to see what he can do with a only disease at this time.  In the meantime, compliant with medications, tolerating regimens.  No cameron substernal chest pain, no orthopnea, no paroxysmal nocturnal dyspnea.  Plans to be more physically active now that the weather is better.  No headache, blurred vision, no diplopia, no dysphagia.  Continues to want to improve quality of life, and does not wish harm to self or others.  ENDOCRINE with no polyuria, polydipsia, polyphagia.  No blurred vision.  No skin, hair, nail changes.  No dramatic weight loss or weight gain.    Occasional nocturia, no dysuria.  Appetite preserved, no abdominal distress.  No particular skin changes.  No particular cough or sputum production.  CONSTITUTIONALLY, no fever, no chills.  No night sweats.  No lingering anorexia or nausea.  No apparent lymphadenopathy.  No apparent weight loss.        Review of Systems  Review of systems as in history of present illness, and otherwise, reviewed separately as well, and was unremarkable/negative/noncontributory.        Objective   /69 (BP Location: Left arm, Patient Position: Sitting, BP Cuff Size: Adult)   Pulse 84   Ht 1.753 m (5' 9\")   Wt 95.1 kg (209 lb 11.2 oz)   SpO2 95%   BMI 30.97 kg/m²     Physical Exam  In good spirits.  Not in distress or diaphoresis.  Alert, oriented x 3.  Amiable.  Not unkempt.  Moderately obese build, but remaining independent and capable.  Appropriate.  Eager to maintain and hopefully improve quality of life.  Does not wish harm to self or others.     HEAD pink palpebral conjunctivae, anicteric sclerae.  NECK supple, no apparent jugular venous distention.  CARDIOVASCULAR not in distress or diaphoresis.  No bipedal edema.  Regular rate and rhythm.  No " murmurs appreciated.  LUNGS not in distress or diaphoresis.  Not using accessory muscles.  Clear to auscultation bilaterally.    ABDOMEN soft, nontender.  BACK no costovertebral angle tenderness.  EXTREMITIES no clubbing, no cyanosis.  NEURO no facial asymmetry.  No apparent cranial nerve deficits.  Romberg negative.  Ambulating without need of assistance.  No apparent focal weakness.  No tremors.  PSYCH receptive, appropriate, and eager to maintain and improve quality of life.        LABORATORY results noted, hemoglobin A1c 6.7, body mass index 30.97, largely unchanged.  LDL cholesterol 67, with current ASCVD risk 23.1%.  Liver function preserved.  Vitamin D 44  Anemia seemingly stable, noted.  Followed by hematology.  PSA negative.        Assessment/Plan   Diagnoses and all orders for this visit:  Type 2 diabetes mellitus with hyperglycemia, with long-term current use of insulin  -     Follow Up In Primary Care - Established  -     Comprehensive Metabolic Panel; Future  -     Hemoglobin A1C; Future  -     Follow Up In Primary Care - [facility]; Future  Type 2 diabetes mellitus with diabetic polyneuropathy, with long-term current use of insulin  -     Comprehensive Metabolic Panel; Future  -     Hemoglobin A1C; Future  -     Follow Up In Primary Care - [facility]; Future  Mixed hyperlipidemia  -     Comprehensive Metabolic Panel; Future  -     Follow Up In Primary Care - [facility]; Future  Essential hypertension  -     Comprehensive Metabolic Panel; Future  -     Follow Up In Primary Care - [facility]; Future  Paroxysmal atrial fibrillation (Multi)  -     Follow Up In Primary Care - [facility]; Future  [place] cardiac risk >20% in next 10 years  Comments:  24.9% 04/24  Orders:  -     Follow Up In Primary Care - [facility]; Future  Class 1 obesity due to excess calories with serious comorbidity and body mass index (BMI) of 30.0 to 30.9 in adult  -     Follow Up In Primary Care - [facility]; Future  Iron  deficiency anemia, unspecified iron deficiency anemia type  -     Follow Up In Primary Care - Established; Future  Microscopic hematuria  -     Follow Up In Primary Care - Established; Future  Nephrolithiasis  -     Follow Up In Primary Care - Established; Future  Vitamin D deficiency  -     Follow Up In Primary Care - Established; Future  Recurrent major depressive disorder, in full remission  -     venlafaxine XR (Effexor-XR) 150 mg 24 hr capsule; Please take 1 tablet by mouth with LUNCH every day.  Do not crush or chew.  Thank you.  -     Follow Up In Primary Care - Established; Future       Thank very much for coming.  I am very happy to see you again.    I am glad that you have been physically active, and that you plan to eat more sensibly.  Let us see what you can do over the next 3 months.    Come back in 3 months, and repeat some NONFASTING blood examinations.  This will help us determine what else we can do as far as blood sugar and weight options are concerned.    Until then, please maximize DIET.  [employer] diet book, DASH diet, Mediterranean diet for ideas please.    Also, please stay physically active.  I do appreciate that now that the weather is better, you plan to be outside more.  Make sure that you are doing lots of BRISK WALKING, which will probably be the easiest exercise to do.  Also, get yourself into the gym if possible.  I do appreciate your efforts.    Brisk walking for 10 minutes in the morning, 10 minutes after lunch, and again 10 minutes in the afternoon, for a minimum of 30 minutes a day, every day, these will impact not only your blood sugar, but also your mood, energy, as well as weight and blood pressure!    See you in 3 months.  Call or text sooner as needed.  Take care and God bless.  I hope you have a blessed early week and a happy Easter!            0  Return in 3 months.  20 minutes please.  Repeat NONFASTING laboratory examination, then see me for reevaluation of blood sugar  control, options regarding weight management.  Coordinate with cardiology, EP, as patient is seen.  Coordinate with hematology, urology, nephrology, podiatry, ophthalmology.  Consider options regarding weight management.            0

## 2025-06-05 DIAGNOSIS — E78.2 MIXED HYPERLIPIDEMIA: ICD-10-CM

## 2025-06-05 RX ORDER — ROSUVASTATIN CALCIUM 10 MG/1
TABLET, COATED ORAL
Qty: 90 TABLET | Refills: 0 | Status: SHIPPED | OUTPATIENT
Start: 2025-06-05 | End: 2025-06-06

## 2025-06-06 DIAGNOSIS — E78.2 MIXED HYPERLIPIDEMIA: ICD-10-CM

## 2025-06-06 RX ORDER — ROSUVASTATIN CALCIUM 10 MG/1
10 TABLET, COATED ORAL NIGHTLY
Qty: 90 TABLET | Refills: 0 | Status: SHIPPED | OUTPATIENT
Start: 2025-06-06

## 2025-06-14 DIAGNOSIS — Z79.4 TYPE 2 DIABETES MELLITUS WITH HYPERGLYCEMIA, WITH LONG-TERM CURRENT USE OF INSULIN: ICD-10-CM

## 2025-06-14 DIAGNOSIS — E11.65 TYPE 2 DIABETES MELLITUS WITH HYPERGLYCEMIA, WITH LONG-TERM CURRENT USE OF INSULIN: ICD-10-CM

## 2025-06-14 DIAGNOSIS — I10 ESSENTIAL HYPERTENSION: ICD-10-CM

## 2025-06-16 RX ORDER — METFORMIN HYDROCHLORIDE 850 MG/1
TABLET ORAL
Qty: 270 TABLET | Refills: 0 | Status: SHIPPED | OUTPATIENT
Start: 2025-06-16

## 2025-06-16 RX ORDER — LOSARTAN POTASSIUM 100 MG/1
100 TABLET ORAL DAILY
Qty: 90 TABLET | Refills: 0 | Status: SHIPPED | OUTPATIENT
Start: 2025-06-16

## 2025-07-12 LAB
ALBUMIN SERPL-MCNC: 4.3 G/DL (ref 3.6–5.1)
ALP SERPL-CCNC: 69 U/L (ref 35–144)
ALT SERPL-CCNC: 19 U/L (ref 9–46)
ANION GAP SERPL CALCULATED.4IONS-SCNC: 8 MMOL/L (CALC) (ref 7–17)
AST SERPL-CCNC: 15 U/L (ref 10–35)
BILIRUB SERPL-MCNC: 0.6 MG/DL (ref 0.2–1.2)
BUN SERPL-MCNC: 11 MG/DL (ref 7–25)
CALCIUM SERPL-MCNC: 9.2 MG/DL (ref 8.6–10.3)
CHLORIDE SERPL-SCNC: 104 MMOL/L (ref 98–110)
CO2 SERPL-SCNC: 28 MMOL/L (ref 20–32)
CREAT SERPL-MCNC: 0.85 MG/DL (ref 0.7–1.35)
EGFRCR SERPLBLD CKD-EPI 2021: 94 ML/MIN/1.73M2
EST. AVERAGE GLUCOSE BLD GHB EST-MCNC: 148 MG/DL
EST. AVERAGE GLUCOSE BLD GHB EST-SCNC: 8.2 MMOL/L
GLUCOSE SERPL-MCNC: 111 MG/DL (ref 65–99)
HBA1C MFR BLD: 6.8 %
POTASSIUM SERPL-SCNC: 5.1 MMOL/L (ref 3.5–5.3)
PROT SERPL-MCNC: 6.6 G/DL (ref 6.1–8.1)
SODIUM SERPL-SCNC: 140 MMOL/L (ref 135–146)

## 2025-07-14 DIAGNOSIS — E11.65 TYPE 2 DIABETES MELLITUS WITH HYPERGLYCEMIA, WITH LONG-TERM CURRENT USE OF INSULIN: ICD-10-CM

## 2025-07-14 DIAGNOSIS — Z79.4 TYPE 2 DIABETES MELLITUS WITH DIABETIC POLYNEUROPATHY, WITH LONG-TERM CURRENT USE OF INSULIN: ICD-10-CM

## 2025-07-14 DIAGNOSIS — I10 ESSENTIAL HYPERTENSION: ICD-10-CM

## 2025-07-14 DIAGNOSIS — E78.2 MIXED HYPERLIPIDEMIA: ICD-10-CM

## 2025-07-14 DIAGNOSIS — E11.42 TYPE 2 DIABETES MELLITUS WITH DIABETIC POLYNEUROPATHY, WITH LONG-TERM CURRENT USE OF INSULIN: ICD-10-CM

## 2025-07-14 DIAGNOSIS — Z79.4 TYPE 2 DIABETES MELLITUS WITH HYPERGLYCEMIA, WITH LONG-TERM CURRENT USE OF INSULIN: ICD-10-CM

## 2025-07-16 ENCOUNTER — APPOINTMENT (OUTPATIENT)
Dept: PRIMARY CARE | Facility: CLINIC | Age: 70
End: 2025-07-16
Payer: MEDICARE

## 2025-07-16 VITALS
OXYGEN SATURATION: 95 % | WEIGHT: 198.7 LBS | HEIGHT: 69 IN | SYSTOLIC BLOOD PRESSURE: 116 MMHG | BODY MASS INDEX: 29.43 KG/M2 | HEART RATE: 81 BPM | DIASTOLIC BLOOD PRESSURE: 65 MMHG

## 2025-07-16 DIAGNOSIS — F33.42 RECURRENT MAJOR DEPRESSIVE DISORDER, IN FULL REMISSION: ICD-10-CM

## 2025-07-16 DIAGNOSIS — I48.0 PAROXYSMAL ATRIAL FIBRILLATION (MULTI): ICD-10-CM

## 2025-07-16 DIAGNOSIS — E78.2 MIXED HYPERLIPIDEMIA: ICD-10-CM

## 2025-07-16 DIAGNOSIS — E11.65 TYPE 2 DIABETES MELLITUS WITH HYPERGLYCEMIA, WITH LONG-TERM CURRENT USE OF INSULIN: Primary | ICD-10-CM

## 2025-07-16 DIAGNOSIS — D50.9 IRON DEFICIENCY ANEMIA, UNSPECIFIED IRON DEFICIENCY ANEMIA TYPE: ICD-10-CM

## 2025-07-16 DIAGNOSIS — Z79.4 TYPE 2 DIABETES MELLITUS WITH DIABETIC POLYNEUROPATHY, WITH LONG-TERM CURRENT USE OF INSULIN: ICD-10-CM

## 2025-07-16 DIAGNOSIS — Z91.89 FRAMINGHAM CARDIAC RISK >20% IN NEXT 10 YEARS: Chronic | ICD-10-CM

## 2025-07-16 DIAGNOSIS — R31.29 MICROSCOPIC HEMATURIA: ICD-10-CM

## 2025-07-16 DIAGNOSIS — I10 ESSENTIAL HYPERTENSION: ICD-10-CM

## 2025-07-16 DIAGNOSIS — Z79.4 TYPE 2 DIABETES MELLITUS WITH HYPERGLYCEMIA, WITH LONG-TERM CURRENT USE OF INSULIN: Primary | ICD-10-CM

## 2025-07-16 DIAGNOSIS — E55.9 VITAMIN D DEFICIENCY: ICD-10-CM

## 2025-07-16 DIAGNOSIS — N20.0 NEPHROLITHIASIS: ICD-10-CM

## 2025-07-16 DIAGNOSIS — E66.3 OVERWEIGHT (BMI 25.0-29.9): ICD-10-CM

## 2025-07-16 DIAGNOSIS — E11.42 TYPE 2 DIABETES MELLITUS WITH DIABETIC POLYNEUROPATHY, WITH LONG-TERM CURRENT USE OF INSULIN: ICD-10-CM

## 2025-07-16 PROCEDURE — 3074F SYST BP LT 130 MM HG: CPT | Performed by: INTERNAL MEDICINE

## 2025-07-16 PROCEDURE — 99213 OFFICE O/P EST LOW 20 MIN: CPT | Performed by: INTERNAL MEDICINE

## 2025-07-16 PROCEDURE — 4010F ACE/ARB THERAPY RXD/TAKEN: CPT | Performed by: INTERNAL MEDICINE

## 2025-07-16 PROCEDURE — 3078F DIAST BP <80 MM HG: CPT | Performed by: INTERNAL MEDICINE

## 2025-07-16 PROCEDURE — 1159F MED LIST DOCD IN RCRD: CPT | Performed by: INTERNAL MEDICINE

## 2025-07-16 PROCEDURE — G2211 COMPLEX E/M VISIT ADD ON: HCPCS | Performed by: INTERNAL MEDICINE

## 2025-07-16 PROCEDURE — 3008F BODY MASS INDEX DOCD: CPT | Performed by: INTERNAL MEDICINE

## 2025-07-16 RX ORDER — DULAGLUTIDE 1.5 MG/.5ML
INJECTION, SOLUTION SUBCUTANEOUS
Qty: 6 ML | Refills: 1 | Status: CANCELLED | OUTPATIENT
Start: 2025-07-16

## 2025-07-16 ASSESSMENT — PATIENT HEALTH QUESTIONNAIRE - PHQ9
2. FEELING DOWN, DEPRESSED OR HOPELESS: NOT AT ALL
SUM OF ALL RESPONSES TO PHQ9 QUESTIONS 1 AND 2: 0
1. LITTLE INTEREST OR PLEASURE IN DOING THINGS: NOT AT ALL

## 2025-07-16 ASSESSMENT — COLUMBIA-SUICIDE SEVERITY RATING SCALE - C-SSRS
2. HAVE YOU ACTUALLY HAD ANY THOUGHTS OF KILLING YOURSELF?: NO
6. HAVE YOU EVER DONE ANYTHING, STARTED TO DO ANYTHING, OR PREPARED TO DO ANYTHING TO END YOUR LIFE?: NO
1. IN THE PAST MONTH, HAVE YOU WISHED YOU WERE DEAD OR WISHED YOU COULD GO TO SLEEP AND NOT WAKE UP?: NO

## 2025-07-16 NOTE — PROGRESS NOTES
"Subjective   Patient ID: Giuseppe Oh is a 70 y.o. male who presents for Follow-up (Patient presented today for a 3 month follow up./).    HPI   Compliant with medications, tolerating regimens.  Physically active.  Some rare palpitations perhaps, but otherwise, no cameron substernal chest pain, no orthopnea, no paroxysmal nocturnal dyspnea.  Following closely with CARDIOLOGY, EP.    Modest anemia noted.  No gum or nose bleeding.  No easy bruisability.  Continues to follow closely with HEMATOLOGY.  No apparent blood in urine or stool.  CONSTITUTIONALLY, no fever, no chills.  No night sweats.  No lingering anorexia or nausea.  No apparent lymphadenopathy.  No apparent weight loss.    Has been successful in losing weight, lost 11 pounds in 1 month.  Eating sensibly.  Staying physically active.  ENDOCRINE with no polyuria, polydipsia, polyphagia.  No blurred vision.  No skin, hair, nail changes.  No dramatic weight loss or weight gain.          Review of Systems  Review of systems as in history of present illness, and otherwise, reviewed separately as well, and was unremarkable/negative/noncontributory.        Objective   /65 (BP Location: Left arm, Patient Position: Sitting, BP Cuff Size: Adult)   Pulse 81   Ht 1.753 m (5' 9\")   Wt 90.1 kg (198 lb 11.2 oz)   SpO2 95%   BMI 29.34 kg/m²     Physical Exam  In good spirits.  Not in distress or diaphoresis.  Alert, vented x 3.  Amiable.  Not unkempt.  Continues to want to improve quality of life, and does not wish harm to self or others.  Moderately overweight build.  Remaining independent, capable, appropriate.    HEAD pink palpebral conjunctivae, anicteric sclerae.  Mucous membranes moist.  NECK supple, no apparent jugular venous distention.  No carotid bruit.  CARDIOVASCULAR not in distress or diaphoresis.  No bipedal edema.  Regular rate and rhythm.  No murmurs appreciated.  LUNGS not in distress or diaphoresis.  Not using accessory muscles.  Clear to " auscultation bilaterally.  ABDOMEN soft, nontender.  BACK no costovertebral angle tenderness.  EXTREMITIES no clubbing, no cyanosis.  NEURO no facial asymmetry.  No apparent cranial nerve deficits.  Romberg negative.  Ambulating without need of assistance.  No apparent focal weakness.  No tremors.  PSYCH receptive, appropriate, and eager to maintain and improve quality of life.        LABORATORY results noted, with hemoglobin A1c 6.8, current body mass index 29.34.  11 pound weight loss noted.  In April, vitamin D 44, tolerating vitamin D3 1000 units daily.  Lipid panel 130/50/LDL 67/triglycerides 87.  Tolerating rosuvastatin 10 mg, normal liver function.  ASCVD risk 23.1%.  Albumin negative.  Urinalysis negative.  Anemia indexes, B12, folic acid, normal.  Ferritin 78.        Assessment/Plan   Diagnoses and all orders for this visit:  Type 2 diabetes mellitus with hyperglycemia, with long-term current use of insulin  -     Follow Up In Primary Care - Established  -     dulaglutide (Trulicity) 3 mg/0.5 mL injection; Inject 3 mg under the skin 1 (one) time per week.  -     Follow Up In Primary Care - Established; Future  -     Comprehensive Metabolic Panel; Future  -     Lipid Panel; Future  -     Hemoglobin A1C; Future  Type 2 diabetes mellitus with diabetic polyneuropathy, with long-term current use of insulin  -     Follow Up In Primary Care - Established; Future  Mixed hyperlipidemia  -     dulaglutide (Trulicity) 3 mg/0.5 mL injection; Inject 3 mg under the skin 1 (one) time per week.  -     Follow Up In Primary Care - Established; Future  -     Comprehensive Metabolic Panel; Future  -     Lipid Panel; Future  Essential hypertension  -     dulaglutide (Trulicity) 3 mg/0.5 mL injection; Inject 3 mg under the skin 1 (one) time per week.  -     Follow Up In Primary Care - Established; Future  -     Comprehensive Metabolic Panel; Future  Paroxysmal atrial fibrillation (Multi)  -     Follow Up In Primary Care -  Established; Future  [place] cardiac risk >20% in next 10 years  Comments:  24.9% 04/24  Orders:  -     dulaglutide (Trulicity) 3 mg/0.5 mL injection; Inject 3 mg under the skin 1 (one) time per week.  -     Follow Up In Primary Care - Established; Future  Iron deficiency anemia, unspecified iron deficiency anemia type  -     Follow Up In Primary Care - Established; Future  Microscopic hematuria  -     Follow Up In Primary Care - Established; Future  Nephrolithiasis  -     Follow Up In Primary Care - Established; Future  Overweight (BMI 25.0-29.9)  -     Follow Up In Primary Care - Established; Future  Vitamin D deficiency  -     Follow Up In Primary Care - Established; Future  Recurrent major depressive disorder, in full remission  -     Follow Up In Primary Care - Established; Future       Thank very much for coming.  I am very happy to see you again.  Thank you for taking care of yourself.  Please continue eating sensibly.  [employer] diet book, DASH diet, Mediterranean diet for ideas.    Please continue staying physically active, especially with lots of BRISK WALKING and other aerobic activities.  You have lost 11 pounds in 1 month!    Lets see if you can tolerate TRULICITY 3 mg.  Inject under your skin once a week, just as you have been doing so.  The only difference will be an increase in the dosage from 1.5 mg, now used the 3 mg pens.  You can still finish your 1.5 mg regimen and see if you would like.    Trulicity improves your production of your own INSULIN via your PANCREAS by making your pancreas work more efficiently.  It also helps you lose weight.  Let us see how you do with the adjustment.    Please watch out for NAUSEA, sometimes vomiting.  Watch out for CONSTIPATION, sometimes diarrhea.  Watch out for belly distress.    Increasing your Trulicity may improve your hemoglobin A1c numbers.  The goal is to drop you down to 6.4 or less.  Trulicity can also help you lose weight.    Please come back in  3 months.  You will be due for repeat FASTING laboratory examinations at that point.  Please dial [phone] and schedule your laboratory appointment, so that you do not have to wait for too long.    Until then, please continue to take care of yourself and your family, and please continue to pray for our recovery from this pandemic.  Take care and God bless.            0  Return in 3 months.  20 minutes please.  Repeat FASTING laboratory examinations in a  facility, then see me soon after.  Reassess blood sugar control, efforts regarding weight management.  Consider Mounjaro, consider cutting down on insulins soon after.  Review preventive strategies, cardiovascular risk.  Coordinate with cardiology, EP, hematology, urology, nephrology, as patient is seen.            0

## 2025-07-16 NOTE — PATIENT INSTRUCTIONS
Thank very much for coming.  I am very happy to see you again.  Thank you for taking care of yourself.  Please continue eating sensibly.  [employer] diet book, DASH diet, Mediterranean diet for ideas.    Please continue staying physically active, especially with lots of BRISK WALKING and other aerobic activities.  You have lost 11 pounds in 1 month!    Lets see if you can tolerate TRULICITY 3 mg.  Inject under your skin once a week, just as you have been doing so.  The only difference will be an increase in the dosage from 1.5 mg, now used the 3 mg pens.  You can still finish your 1.5 mg regimen and see if you would like.    Trulicity improves your production of your own INSULIN via your PANCREAS by making your pancreas work more efficiently.  It also helps you lose weight.  Let us see how you do with the adjustment.    Please watch out for NAUSEA, sometimes vomiting.  Watch out for CONSTIPATION, sometimes diarrhea.  Watch out for belly distress.    Increasing your Trulicity may improve your hemoglobin A1c numbers.  The goal is to drop you down to 6.4 or less.  Trulicity can also help you lose weight.    Please come back in 3 months.  You will be due for repeat FASTING laboratory examinations at that point.  Please dial [phone] and schedule your laboratory appointment, so that you do not have to wait for too long.    Until then, please continue to take care of yourself and your family, and please continue to pray for our recovery from this pandemic.  Take care and God bless.            0  Return in 3 months.  20 minutes please.  Repeat FASTING laboratory examinations in a  facility, then see me soon after.  Reassess blood sugar control, efforts regarding weight management.  Consider Mounjaro, consider cutting down on insulins soon after.  Review preventive strategies, cardiovascular risk.  Coordinate with cardiology, EP, hematology, urology, nephrology, as patient is seen.            0

## 2025-07-30 DIAGNOSIS — F33.42 RECURRENT MAJOR DEPRESSIVE DISORDER, IN FULL REMISSION: ICD-10-CM

## 2025-07-30 DIAGNOSIS — E78.2 MIXED HYPERLIPIDEMIA: ICD-10-CM

## 2025-07-31 RX ORDER — VENLAFAXINE HYDROCHLORIDE 150 MG/1
CAPSULE, EXTENDED RELEASE ORAL
Qty: 90 CAPSULE | Refills: 0 | Status: SHIPPED | OUTPATIENT
Start: 2025-07-31

## 2025-07-31 RX ORDER — ROSUVASTATIN CALCIUM 10 MG/1
10 TABLET, COATED ORAL NIGHTLY
Qty: 90 TABLET | Refills: 0 | Status: SHIPPED | OUTPATIENT
Start: 2025-07-31

## 2025-08-06 DIAGNOSIS — I10 ESSENTIAL HYPERTENSION: ICD-10-CM

## 2025-08-06 DIAGNOSIS — E11.65 TYPE 2 DIABETES MELLITUS WITH HYPERGLYCEMIA, WITH LONG-TERM CURRENT USE OF INSULIN: ICD-10-CM

## 2025-08-06 DIAGNOSIS — Z79.4 TYPE 2 DIABETES MELLITUS WITH HYPERGLYCEMIA, WITH LONG-TERM CURRENT USE OF INSULIN: ICD-10-CM

## 2025-08-06 DIAGNOSIS — E78.2 MIXED HYPERLIPIDEMIA: ICD-10-CM

## 2025-08-06 DIAGNOSIS — Z91.89 FRAMINGHAM CARDIAC RISK >20% IN NEXT 10 YEARS: Chronic | ICD-10-CM

## 2025-08-22 DIAGNOSIS — Z79.4 CONTROLLED TYPE 2 DIABETES MELLITUS WITH HYPERGLYCEMIA, WITH LONG-TERM CURRENT USE OF INSULIN (MULTI): ICD-10-CM

## 2025-08-22 DIAGNOSIS — E78.2 MIXED HYPERLIPIDEMIA: ICD-10-CM

## 2025-08-22 DIAGNOSIS — E11.65 CONTROLLED TYPE 2 DIABETES MELLITUS WITH HYPERGLYCEMIA, WITH LONG-TERM CURRENT USE OF INSULIN (MULTI): ICD-10-CM

## 2025-08-22 RX ORDER — PEN NEEDLE, DIABETIC 32 GX 1/4"
NEEDLE, DISPOSABLE MISCELLANEOUS
Qty: 90 EACH | Refills: 3 | Status: SHIPPED | OUTPATIENT
Start: 2025-08-22

## 2025-08-22 RX ORDER — ROSUVASTATIN CALCIUM 10 MG/1
10 TABLET, COATED ORAL NIGHTLY
Qty: 90 TABLET | Refills: 0 | Status: SHIPPED | OUTPATIENT
Start: 2025-08-22

## 2025-10-21 ENCOUNTER — APPOINTMENT (OUTPATIENT)
Dept: PRIMARY CARE | Facility: CLINIC | Age: 70
End: 2025-10-21
Payer: MEDICARE

## 2025-10-28 ENCOUNTER — APPOINTMENT (OUTPATIENT)
Dept: PRIMARY CARE | Facility: CLINIC | Age: 70
End: 2025-10-28
Payer: MEDICARE